# Patient Record
Sex: MALE | Race: ASIAN | NOT HISPANIC OR LATINO
[De-identification: names, ages, dates, MRNs, and addresses within clinical notes are randomized per-mention and may not be internally consistent; named-entity substitution may affect disease eponyms.]

---

## 2017-02-17 VITALS
HEART RATE: 68 BPM | SYSTOLIC BLOOD PRESSURE: 120 MMHG | WEIGHT: 154.98 LBS | DIASTOLIC BLOOD PRESSURE: 79 MMHG | OXYGEN SATURATION: 99 % | RESPIRATION RATE: 18 BRPM | HEIGHT: 65 IN

## 2017-02-17 RX ORDER — CHLORHEXIDINE GLUCONATE 213 G/1000ML
1 SOLUTION TOPICAL ONCE
Qty: 0 | Refills: 0 | Status: DISCONTINUED | OUTPATIENT
Start: 2017-02-21 | End: 2017-02-21

## 2017-02-17 NOTE — H&P ADULT. - ASSESSMENT
51 yr old M with PMHx of HTN, hyperlipidemia, known CAD with prior PCI, with CCS Angina Class III equivalent symptoms and known residual LAD disease, who now presents for recommended cardiac catheterization with possible intervention.    ASA III                     Mallampati II    Risks & benefits of procedure and alternative therapy have been explained to the patient including but not limited to: allergic reaction, bleeding w/possible need for blood transfusion, infection, renal and vascular compromise, limb damage, arrhythmia, stroke, vessel dissection/perforation, Myocardial infarction, emergent CABG. Informed consent obtained and in chart. 51 yr old M with PMHx of HTN, hyperlipidemia, known CAD with prior PCI, with CCS Angina Class III equivalent symptoms on metoprolol and known residual LAD disease, who now presents for recommended cardiac catheterization with possible intervention.    ASA III                     Mallampati II    Risks & benefits of procedure and alternative therapy have been explained to the patient including but not limited to: allergic reaction, bleeding w/possible need for blood transfusion, infection, renal and vascular compromise, limb damage, arrhythmia, stroke, vessel dissection/perforation, Myocardial infarction, emergent CABG. Informed consent obtained and in chart.

## 2017-02-17 NOTE — H&P ADULT. - NEGATIVE CARDIOVASCULAR SYMPTOMS
no paroxysmal nocturnal dyspnea/no palpitations/no dyspnea on exertion/no claudication/no peripheral edema/no orthopnea

## 2017-02-17 NOTE — H&P ADULT. - PROBLEM SELECTOR PLAN 1
NPO, precath/consented, will load with ASA 325mg PO x 1 dose and Plavix 600mg PO x 1 dose prior to procedure NPO, precath/consented, will load with ASA 325mg PO x 1 dose and Plavix 600mg PO x 1 dose prior to procedure. Patient states he will be compliant with all his medications upon discharge, as patient was explained risks of stopping his medications if he receives a stent placement today

## 2017-02-17 NOTE — H&P ADULT. - HISTORY OF PRESENT ILLNESS
52 y/o M h/o medication non-compliance 2/2 no insurance, PMHx HTN, HLD, known CAD s/p NSTEMI 7/2016 at [facility]( 50 y/o M h/o medication non-compliance 2/2 no insurance, PMHx HTN, HLD, known CAD s/p NSTEMI 7/2016 at [facility](RAMÍREZ prox and mRCA with known residual LAD disease), who presented for a f/u visit in Dr. Ordaz's office with c/o non-radiating chest pain described as a "twinge" on ambulation of 1 flight of stairs relieved on its own and less severe as his symptoms when he had his NSTEMI in July 2016..  Pt reports he only takes ASA 81mg as he discontinued all other medication including his Plavix 2 months ago as he "ran out" because he has no insurance.  Denies any CP, SOB at rest, palpitations, PND, orthopnea, LE edema.  Echo done 7/26/16 revealed LVEF 72%, no regional wall motion abnormalities, mild MR.     In light of patient's medication non-compliance with residual LAD disease and CCS Class 3 Anginal Equivalent symptoms he is now referred to [facility] for recommended Cardiac Catheterization with intervention if clinically indicated. 52 y/o M h/o medication non-compliance 2/2 no insurance, PMHx HTN, HLD, known CAD s/p NSTEMI 7/2016 at [facility](RAMÍREZ prox and mRCA with known residual LAD disease), who presented for a f/u visit in Dr. Ordaz's office with c/o non-radiating chest pain described as a "twinge" on ambulation of 1 flight of stairs relieved on its own and less severe as his symptoms when he had his NSTEMI in July 2016..  Pt reports he only takes ASA 81mg as he discontinued all other medication including his Plavix 2 months ago as he "ran out" because he has no insurance.  Denies any CP, SOB at rest, SANDOVAL, palpitations, PND, orthopnea, LE edema.  Echo done 7/26/16 revealed LVEF 72%, no regional wall motion abnormalities, mild MR.     In light of patient's medication non-compliance with residual LAD disease and CCS Class 3 Anginal Equivalent symptoms he is now referred to [facility] for recommended Cardiac Catheterization with intervention if clinically indicated.    Cardiac Cath at [facility] 7/27/16  -LM mild LI  -pLAD diffuse 80% stenosis  -D1 mild LI  -Cfx minor LI  -OM1 minor LI  -RCA large dominant  -pRCA 80% stenosis tx with RAMÍREZ x 1  -mRCA 85% stenosis tx with RAMÍREZ x 1  -Recommended to return for staged PCI of LAD in 4 weeks 50 y/o M h/o medication non-compliance 2/2 no insurance, PMHx HTN, HLD, known CAD s/p NSTEMI 7/2016 at [facility](RAMÍREZ prox and mRCA with known residual LAD disease), who presented for a f/u visit in Dr. Ordaz's office with c/o non-radiating chest pain described as a "twinge" on ambulation of 1 flight of stairs relieved on its own and less severe as his symptoms when he had his NSTEMI in July 2016..  Pt reports he only takes ASA 81mg as he discontinued all other medication including his Plavix 2 months ago as he "ran out" because he has no insurance.  Denies any CP, SOB at rest, SANDOVAL, palpitations, PND, orthopnea, LE edema.  Echo done 7/26/16 revealed LVEF 72%, no regional wall motion abnormalities, mild MR.     In light of patient's medication non-compliance with residual LAD disease and CCS Class III Anginal Equivalent symptoms he is now referred to [facility] for recommended Cardiac Catheterization with intervention if clinically indicated.    Cardiac Cath at [facility] 7/27/16  -LM mild LI  -pLAD diffuse 80% stenosis  -D1 mild LI  -Cfx minor LI  -OM1 minor LI  -RCA large dominant  -pRCA 80% stenosis tx with RAMÍREZ x 1  -mRCA 85% stenosis tx with RAMÍREZ x 1  -Recommended to return for staged PCI of LAD in 4 weeks 50 y/o M with PMHx HTN, HLD, known CAD s/p NSTEMI 7/2016 at [facility](RAMÍREZ prox and mRCA with known residual LAD disease), who presented for a f/u visit in Dr. Ordaz's office with c/o non-radiating chest pain described as a "twinge" on ambulation of 1 flight of stairs relieved on its own and less severe as his symptoms when he had his NSTEMI in July 2016.  Pt reports he only takes ASA 81mg as he discontinued all other medication including his Plavix 2 months ago as he "ran out" because he had no insurance, however resumed his medications on Friday 2/17/17 after his insurance was reactivated.  Denies any CP, SOB at rest, SANDOVAL, palpitations, PND, orthopnea, LE edema.  Echo done 7/26/16 revealed LVEF 72%, no regional wall motion abnormalities, mild MR.     In light of patient's medication non-compliance with residual LAD disease and CCS Class III Anginal Equivalent symptoms he is now referred to [facility] for recommended Cardiac Catheterization with intervention if clinically indicated.    Of note: Patient states his insurance has been reactivated and he was prescribed medications on Friday 2/17/17 which he has since been compliant with.     Cardiac Cath at [facility] 7/27/16  -LM mild LI  -pLAD diffuse 80% stenosis  -D1 mild LI  -Cfx minor LI  -OM1 minor LI  -RCA large dominant  -pRCA 80% stenosis tx with RAMÍREZ x 1  -mRCA 85% stenosis tx with RAMÍREZ x 1  -Recommended to return for staged PCI of LAD in 4 weeks 52 y/o M with PMHx HTN, HLD, known CAD s/p NSTEMI 7/2016 at [facility](RAMÍREZ prox and mRCA with known residual LAD disease), who presented for a f/u visit in Dr. Ordaz's office with c/o non-radiating chest pain described as a "twinge" on ambulation of 1 flight of stairs relieved on its own and less severe as his symptoms when he had his NSTEMI in July 2016.  Pt reports he only takes ASA 81mg as he discontinued all other medication including his Plavix 2 months ago as he "ran out" because he had no insurance, however resumed his medications on Friday 2/17/17 after his insurance was reactivated.  Denies any CP, SOB at rest, SANDOVAL, palpitations, PND, orthopnea, LE edema.  Echo done 7/26/16 revealed LVEF 72%, no regional wall motion abnormalities, mild MR.     In light of patient's medication non-compliance with residual LAD disease and CCS Class III Anginal Equivalent symptoms while on metoprolol, he is now referred to [facility] for recommended Cardiac Catheterization with intervention if clinically indicated.    Of note: Patient states his insurance has been reactivated and he was prescribed medications on Friday 2/17/17 which he has since been compliant with.     Cardiac Cath at [facility] 7/27/16  -LM mild LI  -pLAD diffuse 80% stenosis  -D1 mild LI  -Cfx minor LI  -OM1 minor LI  -RCA large dominant  -pRCA 80% stenosis tx with RAMÍREZ x 1  -mRCA 85% stenosis tx with RAMÍREZ x 1  -Recommended to return for staged PCI of LAD in 4 weeks

## 2017-02-20 ENCOUNTER — FORM ENCOUNTER (OUTPATIENT)
Age: 52
End: 2017-02-20

## 2017-02-21 ENCOUNTER — OUTPATIENT (OUTPATIENT)
Dept: OUTPATIENT SERVICES | Facility: HOSPITAL | Age: 52
LOS: 1 days | Discharge: NURSING, REHAB OR LONG-TERM CARE | End: 2017-02-21
Payer: COMMERCIAL

## 2017-02-21 DIAGNOSIS — E78.5 HYPERLIPIDEMIA, UNSPECIFIED: ICD-10-CM

## 2017-02-21 DIAGNOSIS — I25.110 ATHEROSCLEROTIC HEART DISEASE OF NATIVE CORONARY ARTERY WITH UNSTABLE ANGINA PECTORIS: ICD-10-CM

## 2017-02-21 DIAGNOSIS — I25.10 ATHEROSCLEROTIC HEART DISEASE OF NATIVE CORONARY ARTERY WITHOUT ANGINA PECTORIS: Chronic | ICD-10-CM

## 2017-02-21 DIAGNOSIS — I20.9 ANGINA PECTORIS, UNSPECIFIED: ICD-10-CM

## 2017-02-21 DIAGNOSIS — I10 ESSENTIAL (PRIMARY) HYPERTENSION: ICD-10-CM

## 2017-02-21 DIAGNOSIS — Z82.49 FAMILY HISTORY OF ISCHEMIC HEART DISEASE AND OTHER DISEASES OF THE CIRCULATORY SYSTEM: ICD-10-CM

## 2017-02-21 PROBLEM — I21.4 NON-ST ELEVATION (NSTEMI) MYOCARDIAL INFARCTION: Chronic | Status: ACTIVE | Noted: 2017-02-17

## 2017-02-21 PROBLEM — Z00.00 ENCOUNTER FOR PREVENTIVE HEALTH EXAMINATION: Status: ACTIVE | Noted: 2017-02-21

## 2017-02-21 LAB
ALBUMIN SERPL ELPH-MCNC: 3.9 G/DL — SIGNIFICANT CHANGE UP (ref 3.4–5)
ALP SERPL-CCNC: 108 U/L — SIGNIFICANT CHANGE UP (ref 40–120)
ALT FLD-CCNC: 60 U/L — HIGH (ref 12–42)
ANION GAP SERPL CALC-SCNC: 8 MMOL/L — LOW (ref 9–16)
APPEARANCE UR: CLEAR — SIGNIFICANT CHANGE UP
APTT BLD: 32.9 SEC — SIGNIFICANT CHANGE UP (ref 27.5–37.4)
AST SERPL-CCNC: 33 U/L — SIGNIFICANT CHANGE UP (ref 15–37)
BASOPHILS NFR BLD AUTO: 0.2 % — SIGNIFICANT CHANGE UP (ref 0–2)
BILIRUB SERPL-MCNC: 0.6 MG/DL — SIGNIFICANT CHANGE UP (ref 0.2–1.2)
BILIRUB UR-MCNC: NEGATIVE — SIGNIFICANT CHANGE UP
BLD GP AB SCN SERPL QL: NEGATIVE — SIGNIFICANT CHANGE UP
BUN SERPL-MCNC: 10 MG/DL — SIGNIFICANT CHANGE UP (ref 7–23)
CALCIUM SERPL-MCNC: 8.8 MG/DL — SIGNIFICANT CHANGE UP (ref 8.5–10.5)
CHLORIDE SERPL-SCNC: 105 MMOL/L — SIGNIFICANT CHANGE UP (ref 96–108)
CHOLEST SERPL-MCNC: 193 MG/DL — SIGNIFICANT CHANGE UP
CK MB CFR SERPL CALC: 1.3 NG/ML — SIGNIFICANT CHANGE UP (ref 0.5–3.6)
CO2 SERPL-SCNC: 27 MMOL/L — SIGNIFICANT CHANGE UP (ref 22–31)
COLOR SPEC: YELLOW — SIGNIFICANT CHANGE UP
CREAT SERPL-MCNC: 0.83 MG/DL — SIGNIFICANT CHANGE UP (ref 0.5–1.3)
CRP SERPL-MCNC: <0.29 MG/DL — SIGNIFICANT CHANGE UP
DIFF PNL FLD: NEGATIVE — SIGNIFICANT CHANGE UP
EOSINOPHIL NFR BLD AUTO: 3 % — SIGNIFICANT CHANGE UP (ref 0–6)
GLUCOSE SERPL-MCNC: 114 MG/DL — HIGH (ref 70–99)
GLUCOSE UR QL: 100
HBA1C BLD-MCNC: 6.2 % — HIGH (ref 4.8–5.6)
HBV SURFACE AG SER-ACNC: SIGNIFICANT CHANGE UP
HCT VFR BLD CALC: 42.8 % — SIGNIFICANT CHANGE UP (ref 39–50)
HDLC SERPL-MCNC: 46 MG/DL — SIGNIFICANT CHANGE UP
HGB BLD-MCNC: 15.2 G/DL — SIGNIFICANT CHANGE UP (ref 13–17)
INR BLD: 1.12 — SIGNIFICANT CHANGE UP (ref 0.88–1.16)
KETONES UR-MCNC: NEGATIVE — SIGNIFICANT CHANGE UP
LEUKOCYTE ESTERASE UR-ACNC: NEGATIVE — SIGNIFICANT CHANGE UP
LIPID PNL WITH DIRECT LDL SERPL: 129 MG/DL — HIGH
LYMPHOCYTES # BLD AUTO: 43.1 % — SIGNIFICANT CHANGE UP (ref 13–44)
MCHC RBC-ENTMCNC: 31.5 PG — SIGNIFICANT CHANGE UP (ref 27–34)
MCHC RBC-ENTMCNC: 35.5 G/DL — SIGNIFICANT CHANGE UP (ref 32–36)
MCV RBC AUTO: 88.6 FL — SIGNIFICANT CHANGE UP (ref 80–100)
MONOCYTES NFR BLD AUTO: 11.1 % — SIGNIFICANT CHANGE UP (ref 2–14)
NEUTROPHILS NFR BLD AUTO: 42.6 % — LOW (ref 43–77)
NITRITE UR-MCNC: NEGATIVE — SIGNIFICANT CHANGE UP
PH UR: 7 — SIGNIFICANT CHANGE UP (ref 4–8)
PLATELET # BLD AUTO: 206 K/UL — SIGNIFICANT CHANGE UP (ref 150–400)
POTASSIUM SERPL-MCNC: 4 MMOL/L — SIGNIFICANT CHANGE UP (ref 3.5–5.3)
POTASSIUM SERPL-SCNC: 4 MMOL/L — SIGNIFICANT CHANGE UP (ref 3.5–5.3)
PROT SERPL-MCNC: 8.1 G/DL — SIGNIFICANT CHANGE UP (ref 6.4–8.2)
PROT UR-MCNC: NEGATIVE MG/DL — SIGNIFICANT CHANGE UP
PROTHROM AB SERPL-ACNC: 12.5 SEC — SIGNIFICANT CHANGE UP (ref 10–13.1)
RBC # BLD: 4.83 M/UL — SIGNIFICANT CHANGE UP (ref 4.2–5.8)
RBC # FLD: 12.4 % — SIGNIFICANT CHANGE UP (ref 10.3–16.9)
RH IG SCN BLD-IMP: POSITIVE — SIGNIFICANT CHANGE UP
SODIUM SERPL-SCNC: 140 MMOL/L — SIGNIFICANT CHANGE UP (ref 135–145)
SP GR SPEC: <=1.005 — SIGNIFICANT CHANGE UP (ref 1–1.03)
TOTAL CHOLESTEROL/HDL RATIO MEASUREMENT: 4.2 RATIO — SIGNIFICANT CHANGE UP
TRIGL SERPL-MCNC: 91 MG/DL — SIGNIFICANT CHANGE UP
TSH SERPL-MCNC: 0.78 UIU/ML — SIGNIFICANT CHANGE UP (ref 0.35–4.94)
UROBILINOGEN FLD QL: 0.2 E.U./DL — SIGNIFICANT CHANGE UP
WBC # BLD: 4.9 K/UL — SIGNIFICANT CHANGE UP (ref 3.8–10.5)
WBC # FLD AUTO: 4.9 K/UL — SIGNIFICANT CHANGE UP (ref 3.8–10.5)

## 2017-02-21 PROCEDURE — 85730 THROMBOPLASTIN TIME PARTIAL: CPT

## 2017-02-21 PROCEDURE — 92978 ENDOLUMINL IVUS OCT C 1ST: CPT

## 2017-02-21 PROCEDURE — 85610 PROTHROMBIN TIME: CPT

## 2017-02-21 PROCEDURE — 93306 TTE W/DOPPLER COMPLETE: CPT | Mod: 26

## 2017-02-21 PROCEDURE — 86850 RBC ANTIBODY SCREEN: CPT

## 2017-02-21 PROCEDURE — 93880 EXTRACRANIAL BILAT STUDY: CPT

## 2017-02-21 PROCEDURE — C1887: CPT

## 2017-02-21 PROCEDURE — 71045 X-RAY EXAM CHEST 1 VIEW: CPT

## 2017-02-21 PROCEDURE — C1769: CPT

## 2017-02-21 PROCEDURE — 84443 ASSAY THYROID STIM HORMONE: CPT

## 2017-02-21 PROCEDURE — C1753: CPT

## 2017-02-21 PROCEDURE — 82550 ASSAY OF CK (CPK): CPT

## 2017-02-21 PROCEDURE — 92978 ENDOLUMINL IVUS OCT C 1ST: CPT | Mod: 26

## 2017-02-21 PROCEDURE — 93010 ELECTROCARDIOGRAM REPORT: CPT | Mod: 59

## 2017-02-21 PROCEDURE — 94150 VITAL CAPACITY TEST: CPT

## 2017-02-21 PROCEDURE — 83036 HEMOGLOBIN GLYCOSYLATED A1C: CPT

## 2017-02-21 PROCEDURE — 93306 TTE W/DOPPLER COMPLETE: CPT

## 2017-02-21 PROCEDURE — 71010: CPT | Mod: 26

## 2017-02-21 PROCEDURE — 93458 L HRT ARTERY/VENTRICLE ANGIO: CPT

## 2017-02-21 PROCEDURE — 93005 ELECTROCARDIOGRAM TRACING: CPT

## 2017-02-21 PROCEDURE — 80053 COMPREHEN METABOLIC PANEL: CPT

## 2017-02-21 PROCEDURE — 81003 URINALYSIS AUTO W/O SCOPE: CPT

## 2017-02-21 PROCEDURE — 80061 LIPID PANEL: CPT

## 2017-02-21 PROCEDURE — 93458 L HRT ARTERY/VENTRICLE ANGIO: CPT | Mod: 26

## 2017-02-21 PROCEDURE — 86900 BLOOD TYPING SEROLOGIC ABO: CPT

## 2017-02-21 PROCEDURE — 82553 CREATINE MB FRACTION: CPT

## 2017-02-21 PROCEDURE — 86140 C-REACTIVE PROTEIN: CPT

## 2017-02-21 PROCEDURE — 93880 EXTRACRANIAL BILAT STUDY: CPT | Mod: 26

## 2017-02-21 PROCEDURE — 87340 HEPATITIS B SURFACE AG IA: CPT

## 2017-02-21 PROCEDURE — 85025 COMPLETE CBC W/AUTO DIFF WBC: CPT

## 2017-02-21 PROCEDURE — 94010 BREATHING CAPACITY TEST: CPT | Mod: 26

## 2017-02-21 PROCEDURE — 86901 BLOOD TYPING SEROLOGIC RH(D): CPT

## 2017-02-21 PROCEDURE — 99205 OFFICE O/P NEW HI 60 MIN: CPT

## 2017-02-21 RX ORDER — SODIUM CHLORIDE 9 MG/ML
500 INJECTION INTRAMUSCULAR; INTRAVENOUS; SUBCUTANEOUS
Qty: 0 | Refills: 0 | Status: DISCONTINUED | OUTPATIENT
Start: 2017-02-21 | End: 2017-02-21

## 2017-02-21 RX ORDER — ASPIRIN/CALCIUM CARB/MAGNESIUM 324 MG
325 TABLET ORAL ONCE
Qty: 0 | Refills: 0 | Status: COMPLETED | OUTPATIENT
Start: 2017-02-21 | End: 2017-02-21

## 2017-02-21 RX ORDER — CLOPIDOGREL BISULFATE 75 MG/1
600 TABLET, FILM COATED ORAL ONCE
Qty: 0 | Refills: 0 | Status: COMPLETED | OUTPATIENT
Start: 2017-02-21 | End: 2017-02-21

## 2017-02-21 RX ADMIN — Medication 325 MILLIGRAM(S): at 08:00

## 2017-02-21 RX ADMIN — SODIUM CHLORIDE 75 MILLILITER(S): 9 INJECTION INTRAMUSCULAR; INTRAVENOUS; SUBCUTANEOUS at 08:00

## 2017-02-21 RX ADMIN — CLOPIDOGREL BISULFATE 600 MILLIGRAM(S): 75 TABLET, FILM COATED ORAL at 07:59

## 2017-02-21 NOTE — CONSULT NOTE ADULT - ASSESSMENT
51y Male w/ pmhx HTN, HLD, known CAD s/p NSTEMI 7/2016 at [facility] (RAMÍREZ prox and mRCA with known residual LAD disease) who presented for diagnostic and therapeutic cardiac catherization today with Dr. Ordaz. Pt states he presented for follow up visit with Dr. Ordaz with c/o non-radiating chest pain and SANDOVAL with 1 flight of stairs at home and alleviated at rest. Pt states the chest pain has not progressed and is in less severity from when he had his heart attack back in July. Pt has not been compliant with his medications for the past couple of months due to social issues and lack of insurance. Pt insurance has been reinstated and resumed his medications as of Friday 2/17/17. Cardiac catherization today resulted in 3VCAD and CT Surgery consulted for possible surgical intervention in near future. Pt currently denies chest pain, SOB, SANDOVAL, palpitations, PND, orthopnea, LE edema, dizziness, N/V/C/D.  -preoperative workup for possible surgical intervention: follow up with bedside PFTs, echocardiogram, carotid duplex b/l, T&S  -plan for discharge today as per primary team and SDA on March 1st for possible CABG  -plan discussed with Dr. Denny

## 2017-02-21 NOTE — CONSULT NOTE ADULT - SUBJECTIVE AND OBJECTIVE BOX
Surgeon: Presley Denny    Requesting Physician: Sergio Ordaz    HISTORY OF PRESENT ILLNESS:  51y Male w/ pmhx HTN, HLD, known CAD s/p NSTEMI 7/2016 at [facility] (RAMÍREZ prox and mRCA with known residual LAD disease) who presented for diagnostic and therapeutic cardiac catherization today with Dr. Ordaz. Pt states he presented for follow up visit with Dr. Ordaz with c/o non-radiating chest pain and SANDOVAL with 1 flight of stairs at home and alleviated at rest. Pt states the chest pain has not progressed and is in less severity from when he had his heart attack back in July. Pt has not been compliant with his medications for the past couple of months due to social issues and lack of insurance. Pt insurance has been reinstated and resumed his medications as of Friday 2/17/17. Cardiac catherization today resulted in 3VCAD and CT Surgery consulted for possible surgical intervention in near future. Pt currently denies chest pain, SOB, SANDOVAL, palpitations, PND, orthopnea, LE edema, dizziness, N/V/C/D.    PAST MEDICAL & SURGICAL HISTORY:  NSTEMI (non-ST elevated myocardial infarction)  HLD (hyperlipidemia)  HTN (hypertension)  CAD S/P percutaneous coronary angioplasty      MEDICATIONS  (STANDING):  chlorhexidine 4% Liquid 1Application(s) Topical once  sodium chloride 0.9%. 500milliLiter(s) IV Continuous <Continuous>      Allergies  No Known Allergies    Intolerances  None      SOCIAL HISTORY:  Smoker:  1pack/day for 20 years. Patient quit smoking 2010.  ETOH use: 12 beers/weekend for 20 years. Patient quit drinking since July 2016.  Ilicit Drug use: No  Occupation: elevator   Assisted device use (Cane / Walker): none  Live with: spouse and children at home    FAMILY HISTORY:  No pertinent family history in first degree relatives      Review of Systems  CONSTITUTIONAL:  Denies Fevers / chills, sweats, fatigue, weight loss, weight gain  NEURO:  Denies parathesias, seizures, syncope, confusion   EYES:  Denies Blurry vision, discharge, pain, loss of vision   ENMT:  Denies Difficulty hearing, vertigo, dysphagia, epistaxis, recent dental work  CV:  Denies palpitations, orthopnea. Positive for Chest pain and SANDOVAL.  RESPIRATORY:  Denies Wheezing, SOB, cough / sputum, hemoptysis  GI:  Denies Nausea, vomiting, diarrhea, constipation, melena   : Denies Hematuria, dysuria, urgency, incontinence    MUSKULOSKELETAL:  Denies arthritis, joint swelling, muscle weakness      SKIN/BREAST:  Denies rash, itching, tomy loss, masses       PSYCH:  Denies depression, anxiety, suicidal ideation    HEME/LYMPH:  Denies bruises easily, enlarged lymph nodes, tender lymph nodes    ENDOCRINE:  Denies cold intolerance, heat intolerance, polydipsia     PHYSICAL EXAM    CONSTITUTIONAL: well appearing male, conversing in full sentences in NAD  NEURO: A&OX3, no focal deficits  EYES: EOMI, PERRLA, arcus senilus b/l  ENMT: no JVD  CV: RRR, normal S1S2, no murmur appreciated  RESPIRATORY: CTAB, no wheezes/rales/rhonchi  GI: soft, NT/ND  : no MARTINEZ present  MUSKULOSKELETAL: sensation and strength intact  SKIN / BREAST: good color, no rashes  VASCULAR: +right radial band present, 2+ left radial pulse, 2+ dorsalis pedis b/l                                                          LABS:                        15.2   4.9   )-----------( 206      ( 21 Feb 2017 07:07 )             42.8     21 Feb 2017 07:07    140    |  105    |  10     ----------------------------<  114    4.0     |  27     |  0.83     Ca    8.8        21 Feb 2017 07:07    TPro  8.1    /  Alb  3.9    /  TBili  0.6    /  DBili  x      /  AST  33     /  ALT  60     /  AlkPhos  108    21 Feb 2017 07:07    PT/INR - ( 21 Feb 2017 07:07 )   PT: 12.5 sec;   INR: 1.12          PTT - ( 21 Feb 2017 07:07 )  PTT:32.9 sec    CARDIAC MARKERS ( 21 Feb 2017 07:07 )  x     / x     / 155 U/L / x     / 1.3 ng/mL          RADIOLOGY & ADDITIONAL STUDIES:  CAROTID U/S: pending results today    CXR: pending results today    EKG: pending test today    TTE / ROMAIN: pending results today    Cardiac Cath: 3VCAD 2/21/17

## 2017-02-23 VITALS
RESPIRATION RATE: 18 BRPM | OXYGEN SATURATION: 99 % | WEIGHT: 154.32 LBS | HEART RATE: 68 BPM | DIASTOLIC BLOOD PRESSURE: 79 MMHG | SYSTOLIC BLOOD PRESSURE: 120 MMHG | HEIGHT: 64.96 IN | TEMPERATURE: 98 F

## 2017-02-23 NOTE — H&P ADULT. - PMH
CAD (coronary artery disease)    HLD (hyperlipidemia)    HTN (hypertension)    NSTEMI (non-ST elevated myocardial infarction)

## 2017-02-23 NOTE — H&P ADULT. - OTHER
Echo  There is borderline concentric left ventricular hypertrophy. The left ventricular wall motion is normal.  The left ventricular ejection fraction is estimated to be 55-60%.  The left atrial size is normal. Right atrial size is normal.  The right ventricle is mildly dilated. The right ventricular systolic function is normal.  No aortic regurgitation noted. No hemodynamically significant valvular aortic stenosis.  No mitral regurgitation noted.  There is trace tricuspid regurgitation.  There is trace pulmonic regurgitation. There is no pulmonic stenosis.  No aortic root dilatation.  Normal size aortic arch with no hemodynamic evidence for coarctation  The inferior vena cava was not well visualized.  There is no pericardial effusion.

## 2017-02-23 NOTE — H&P ADULT. - HISTORY OF PRESENT ILLNESS
52 y/o M with PMHx HTN, HLD, known CAD s/p NSTEMI 7/2016 at [facility](RAMÍREZ prox and mRCA with known residual LAD disease), who presented for a f/u visit in Dr. Ordaz's office with c/o non-radiating chest pain described as a "twinge" on ambulation of 1 flight of stairs relieved on its own and less severe as his symptoms when he had his NSTEMI in July 2016.  Pt reports he only takes ASA 81mg as he discontinued all other medication including his Plavix 2 months ago as he "ran out" because he had no insurance, however resumed his medications on Friday 2/17/17 after his insurance was reactivated.  Denies any CP, SOB at rest, SANDOVAL, palpitations, PND, orthopnea, LE edema.  Echo done 7/26/16 revealed LVEF 72%, no regional wall motion abnormalities, mild MR.     In light of patient's medication non-compliance with residual LAD disease and CCS Class III Anginal Equivalent symptoms while on metoprolol, patient was recommended for Cardiac Catheterization. Cardiac cath 2/21/17 revealed: 3V CAD. Patient was evaluated by CT surgery and is deemed a surgical candidate for coronary artery bypass grafting.     Cardiac Cath at [facility] 7/27/16  -LM mild LI  -pLAD diffuse 80% stenosis  -D1 mild LI  -Cfx minor LI  -OM1 minor LI  -RCA large dominant  -pRCA 80% stenosis tx with RAMÍREZ x 1  -mRCA 85% stenosis tx with RAMÍREZ x 1  -Recommended to return for staged PCI of LAD in 4 weeks    Of note: Patient states his insurance has been reactivated and he was prescribed medications on Friday 2/17/17 which he has since been compliant with. 50 y/o M with PMHx HTN, HLD, known CAD s/p NSTEMI 7/2016 at [facility](RAMÍREZ prox and mRCA with known residual LAD disease), who presented for a f/u visit in Dr. Ordaz's office with c/o non-radiating chest pain described as a "twinge" on ambulation of 1 flight of stairs relieved on its own and less severe as his symptoms when he had his NSTEMI in July 2016.  Pt reports he only takes ASA 81mg as he discontinued all other medication including his Plavix 2 months ago as he "ran out" because he had no insurance, however resumed his medications on Friday 2/17/17 after his insurance was reactivated.  Denies any CP, SOB at rest, SANDOVAL, palpitations, PND, orthopnea, LE edema.  Echo done 7/26/16 revealed LVEF 72%, no regional wall motion abnormalities, mild MR.     In light of patient's medication non-compliance with residual LAD disease and CCS Class III Anginal Equivalent symptoms while on metoprolol, patient was recommended for Cardiac Catheterization. Cardiac cath 2/21/17 revealed: 3V CAD. Patient was evaluated by CT surgery and is deemed a surgical candidate for coronary artery bypass grafting.     Cardiac Cath at [facility] 7/27/16  -LM mild LI  -pLAD diffuse 80% stenosis  -D1 mild LI  -Cfx minor LI  -OM1 minor LI  -RCA large dominant  -pRCA 80% stenosis tx with RAMÍREZ x 1  -mRCA 85% stenosis tx with RAMÍREZ x 1  -Recommended to return for staged PCI of LAD in 4 weeks    Of note: Patient states his insurance has been reactivated and he was prescribed medications on Friday 2/17/17 which he has since been compliant with.     He is seen on 3/1 in SDA prior to surgery.  He denies CP, SOB, myalgias, fevers and has been NPO since last evening.  He also states he has stopped taking his plavix over a week ago.

## 2017-02-23 NOTE — H&P ADULT. - RADIOLOGY RESULTS AND INTERPRETATION
Chest xray: No acute pathology.  Carotid doppler: No hemodynamically significant stenosis.  Intimal thickening in the left common carotid artery.

## 2017-02-23 NOTE — H&P ADULT. - PROBLEM SELECTOR PLAN 1
Same Day Admission for CABG on 3/1/17 Same Day Admission for CABG on 3/1/17  Will proceed with surgery  Blood products ordered

## 2017-03-01 ENCOUNTER — APPOINTMENT (OUTPATIENT)
Dept: CARDIOTHORACIC SURGERY | Facility: HOSPITAL | Age: 52
End: 2017-03-01

## 2017-03-01 ENCOUNTER — INPATIENT (INPATIENT)
Facility: HOSPITAL | Age: 52
LOS: 3 days | Discharge: HOME | DRG: 236 | End: 2017-03-05
Attending: THORACIC SURGERY (CARDIOTHORACIC VASCULAR SURGERY) | Admitting: THORACIC SURGERY (CARDIOTHORACIC VASCULAR SURGERY)
Payer: COMMERCIAL

## 2017-03-01 DIAGNOSIS — I25.10 ATHEROSCLEROTIC HEART DISEASE OF NATIVE CORONARY ARTERY WITHOUT ANGINA PECTORIS: Chronic | ICD-10-CM

## 2017-03-01 LAB
ANION GAP SERPL CALC-SCNC: 10 MMOL/L — SIGNIFICANT CHANGE UP (ref 9–16)
ANION GAP SERPL CALC-SCNC: 8 MMOL/L — LOW (ref 9–16)
APTT BLD: 32 SEC — SIGNIFICANT CHANGE UP (ref 27.5–37.4)
BASE EXCESS BLDA CALC-SCNC: -0.8 MMOL/L — SIGNIFICANT CHANGE UP (ref -2–3)
BASE EXCESS BLDA CALC-SCNC: 0.6 MMOL/L — SIGNIFICANT CHANGE UP (ref -2–3)
BASOPHILS NFR BLD AUTO: 0.1 % — SIGNIFICANT CHANGE UP (ref 0–2)
BUN SERPL-MCNC: 12 MG/DL — SIGNIFICANT CHANGE UP (ref 7–23)
BUN SERPL-MCNC: 9 MG/DL — SIGNIFICANT CHANGE UP (ref 7–23)
CA-I BLDA-SCNC: 1.13 MMOL/L — SIGNIFICANT CHANGE UP (ref 1.1–1.3)
CALCIUM SERPL-MCNC: 7.4 MG/DL — LOW (ref 8.5–10.5)
CALCIUM SERPL-MCNC: 7.9 MG/DL — LOW (ref 8.5–10.5)
CHLORIDE SERPL-SCNC: 111 MMOL/L — HIGH (ref 96–108)
CHLORIDE SERPL-SCNC: 111 MMOL/L — HIGH (ref 96–108)
CO2 SERPL-SCNC: 24 MMOL/L — SIGNIFICANT CHANGE UP (ref 22–31)
CO2 SERPL-SCNC: 26 MMOL/L — SIGNIFICANT CHANGE UP (ref 22–31)
COHGB MFR BLDA: 1.2 % — SIGNIFICANT CHANGE UP
CREAT SERPL-MCNC: 0.74 MG/DL — SIGNIFICANT CHANGE UP (ref 0.5–1.3)
CREAT SERPL-MCNC: 0.82 MG/DL — SIGNIFICANT CHANGE UP (ref 0.5–1.3)
EOSINOPHIL NFR BLD AUTO: 0.1 % — SIGNIFICANT CHANGE UP (ref 0–6)
GAS PNL BLDA: SIGNIFICANT CHANGE UP
GLUCOSE SERPL-MCNC: 126 MG/DL — HIGH (ref 70–99)
GLUCOSE SERPL-MCNC: 137 MG/DL — HIGH (ref 70–99)
HCO3 BLDA-SCNC: 22 MMOL/L — SIGNIFICANT CHANGE UP (ref 21–28)
HCO3 BLDA-SCNC: 25 MMOL/L — SIGNIFICANT CHANGE UP (ref 21–28)
HCT VFR BLD CALC: 31.7 % — LOW (ref 39–50)
HCT VFR BLD CALC: 36.6 % — LOW (ref 39–50)
HGB BLD-MCNC: 10.8 G/DL — LOW (ref 13–17)
HGB BLD-MCNC: 12.9 G/DL — LOW (ref 13–17)
HGB BLDA-MCNC: 14.7 G/DL — SIGNIFICANT CHANGE UP (ref 13–17)
INR BLD: 1.29 — HIGH (ref 0.88–1.16)
INR BLD: 1.36 — HIGH (ref 0.88–1.16)
LYMPHOCYTES # BLD AUTO: 12.4 % — LOW (ref 13–44)
MAGNESIUM SERPL-MCNC: 2.4 MG/DL — SIGNIFICANT CHANGE UP (ref 1.6–2.4)
MCHC RBC-ENTMCNC: 29.3 PG — SIGNIFICANT CHANGE UP (ref 27–34)
MCHC RBC-ENTMCNC: 29.7 PG — SIGNIFICANT CHANGE UP (ref 27–34)
MCHC RBC-ENTMCNC: 34.1 G/DL — SIGNIFICANT CHANGE UP (ref 32–36)
MCHC RBC-ENTMCNC: 35.2 G/DL — SIGNIFICANT CHANGE UP (ref 32–36)
MCV RBC AUTO: 84.3 FL — SIGNIFICANT CHANGE UP (ref 80–100)
MCV RBC AUTO: 85.9 FL — SIGNIFICANT CHANGE UP (ref 80–100)
METHGB MFR BLDA: 0.2 % — SIGNIFICANT CHANGE UP
MONOCYTES NFR BLD AUTO: 13.4 % — SIGNIFICANT CHANGE UP (ref 2–14)
NEUTROPHILS NFR BLD AUTO: 74 % — SIGNIFICANT CHANGE UP (ref 43–77)
O2 CT VFR BLDA CALC: 21.4 ML/DL — SIGNIFICANT CHANGE UP (ref 15–23)
OXYHGB MFR BLDA: 98 % — SIGNIFICANT CHANGE UP (ref 94–100)
PCO2 BLDA: 32 MMHG — LOW (ref 35–48)
PCO2 BLDA: 39 MMHG — SIGNIFICANT CHANGE UP (ref 35–48)
PH BLDA: 7.42 — SIGNIFICANT CHANGE UP (ref 7.35–7.45)
PH BLDA: 7.46 — HIGH (ref 7.35–7.45)
PHOSPHATE SERPL-MCNC: 6.4 MG/DL — HIGH (ref 2.5–4.5)
PLATELET # BLD AUTO: 120 K/UL — LOW (ref 150–400)
PLATELET # BLD AUTO: 121 K/UL — LOW (ref 150–400)
PO2 BLDA: 188 MMHG — HIGH (ref 83–108)
PO2 BLDA: 409 MMHG — HIGH (ref 83–108)
POTASSIUM BLDA-SCNC: 4 MMOL/L — SIGNIFICANT CHANGE UP (ref 3.5–4.9)
POTASSIUM SERPL-MCNC: 4.2 MMOL/L — SIGNIFICANT CHANGE UP (ref 3.5–5.3)
POTASSIUM SERPL-MCNC: 4.3 MMOL/L — SIGNIFICANT CHANGE UP (ref 3.5–5.3)
POTASSIUM SERPL-SCNC: 4.2 MMOL/L — SIGNIFICANT CHANGE UP (ref 3.5–5.3)
POTASSIUM SERPL-SCNC: 4.3 MMOL/L — SIGNIFICANT CHANGE UP (ref 3.5–5.3)
PROTHROM AB SERPL-ACNC: 14.3 SEC — HIGH (ref 10–13.1)
PROTHROM AB SERPL-ACNC: 15.1 SEC — HIGH (ref 10–13.1)
RBC # BLD: 3.69 M/UL — LOW (ref 4.2–5.8)
RBC # BLD: 4.34 M/UL — SIGNIFICANT CHANGE UP (ref 4.2–5.8)
RBC # FLD: 13.5 % — SIGNIFICANT CHANGE UP (ref 10.3–16.9)
RBC # FLD: 14.2 % — SIGNIFICANT CHANGE UP (ref 10.3–16.9)
SAO2 % BLDA: 100 % — SIGNIFICANT CHANGE UP (ref 95–100)
SAO2 % BLDA: 99 % — SIGNIFICANT CHANGE UP (ref 95–100)
SODIUM BLDA-SCNC: 140 MMOL/L — SIGNIFICANT CHANGE UP (ref 138–146)
SODIUM SERPL-SCNC: 145 MMOL/L — SIGNIFICANT CHANGE UP (ref 135–145)
SODIUM SERPL-SCNC: 145 MMOL/L — SIGNIFICANT CHANGE UP (ref 135–145)
WBC # BLD: 13.6 K/UL — HIGH (ref 3.8–10.5)
WBC # BLD: 9.8 K/UL — SIGNIFICANT CHANGE UP (ref 3.8–10.5)
WBC # FLD AUTO: 13.6 K/UL — HIGH (ref 3.8–10.5)
WBC # FLD AUTO: 9.8 K/UL — SIGNIFICANT CHANGE UP (ref 3.8–10.5)

## 2017-03-01 PROCEDURE — 33533 CABG ARTERIAL SINGLE: CPT | Mod: AS

## 2017-03-01 PROCEDURE — 93010 ELECTROCARDIOGRAM REPORT: CPT

## 2017-03-01 PROCEDURE — 99291 CRITICAL CARE FIRST HOUR: CPT

## 2017-03-01 PROCEDURE — 71010: CPT | Mod: 26

## 2017-03-01 PROCEDURE — 33517 CABG ARTERY-VEIN SINGLE: CPT | Mod: AS

## 2017-03-01 RX ORDER — FAMOTIDINE 10 MG/ML
20 INJECTION INTRAVENOUS EVERY 12 HOURS
Qty: 0 | Refills: 0 | Status: DISCONTINUED | OUTPATIENT
Start: 2017-03-01 | End: 2017-03-02

## 2017-03-01 RX ORDER — KETOROLAC TROMETHAMINE 30 MG/ML
30 SYRINGE (ML) INJECTION EVERY 6 HOURS
Qty: 0 | Refills: 0 | Status: DISCONTINUED | OUTPATIENT
Start: 2017-03-01 | End: 2017-03-02

## 2017-03-01 RX ORDER — FENTANYL CITRATE 50 UG/ML
12.5 INJECTION INTRAVENOUS ONCE
Qty: 0 | Refills: 0 | Status: DISCONTINUED | OUTPATIENT
Start: 2017-03-01 | End: 2017-03-01

## 2017-03-01 RX ORDER — NICARDIPINE HYDROCHLORIDE 30 MG/1
2.5 CAPSULE, EXTENDED RELEASE ORAL
Qty: 40 | Refills: 0 | Status: DISCONTINUED | OUTPATIENT
Start: 2017-03-01 | End: 2017-03-02

## 2017-03-01 RX ORDER — HEPARIN SODIUM 5000 [USP'U]/ML
5000 INJECTION INTRAVENOUS; SUBCUTANEOUS EVERY 8 HOURS
Qty: 0 | Refills: 0 | Status: DISCONTINUED | OUTPATIENT
Start: 2017-03-01 | End: 2017-03-05

## 2017-03-01 RX ORDER — SODIUM CHLORIDE 9 MG/ML
1000 INJECTION, SOLUTION INTRAVENOUS
Qty: 0 | Refills: 0 | Status: DISCONTINUED | OUTPATIENT
Start: 2017-03-01 | End: 2017-03-05

## 2017-03-01 RX ORDER — CEFAZOLIN SODIUM 1 G
2000 VIAL (EA) INJECTION EVERY 8 HOURS
Qty: 0 | Refills: 0 | Status: COMPLETED | OUTPATIENT
Start: 2017-03-01 | End: 2017-03-03

## 2017-03-01 RX ORDER — FENTANYL CITRATE 50 UG/ML
25 INJECTION INTRAVENOUS ONCE
Qty: 0 | Refills: 0 | Status: DISCONTINUED | OUTPATIENT
Start: 2017-03-01 | End: 2017-03-01

## 2017-03-01 RX ORDER — ASPIRIN/CALCIUM CARB/MAGNESIUM 324 MG
81 TABLET ORAL DAILY
Qty: 0 | Refills: 0 | Status: DISCONTINUED | OUTPATIENT
Start: 2017-03-01 | End: 2017-03-02

## 2017-03-01 RX ORDER — ATORVASTATIN CALCIUM 80 MG/1
80 TABLET, FILM COATED ORAL AT BEDTIME
Qty: 0 | Refills: 0 | Status: DISCONTINUED | OUTPATIENT
Start: 2017-03-01 | End: 2017-03-05

## 2017-03-01 RX ORDER — MAGNESIUM SULFATE 500 MG/ML
2 VIAL (ML) INJECTION ONCE
Qty: 0 | Refills: 0 | Status: DISCONTINUED | OUTPATIENT
Start: 2017-03-01 | End: 2017-03-02

## 2017-03-01 RX ORDER — INSULIN HUMAN 100 [IU]/ML
1 INJECTION, SOLUTION SUBCUTANEOUS
Qty: 250 | Refills: 0 | Status: DISCONTINUED | OUTPATIENT
Start: 2017-03-01 | End: 2017-03-02

## 2017-03-01 RX ORDER — SODIUM CHLORIDE 9 MG/ML
500 INJECTION, SOLUTION INTRAVENOUS ONCE
Qty: 0 | Refills: 0 | Status: COMPLETED | OUTPATIENT
Start: 2017-03-01 | End: 2017-03-01

## 2017-03-01 RX ORDER — MAGNESIUM SULFATE 500 MG/ML
2 VIAL (ML) INJECTION ONCE
Qty: 0 | Refills: 0 | Status: COMPLETED | OUTPATIENT
Start: 2017-03-01 | End: 2017-03-02

## 2017-03-01 RX ORDER — CLOPIDOGREL BISULFATE 75 MG/1
75 TABLET, FILM COATED ORAL DAILY
Qty: 0 | Refills: 0 | Status: DISCONTINUED | OUTPATIENT
Start: 2017-03-01 | End: 2017-03-01

## 2017-03-01 RX ORDER — FENTANYL CITRATE 50 UG/ML
25 INJECTION INTRAVENOUS ONCE
Qty: 0 | Refills: 0 | Status: DISCONTINUED | OUTPATIENT
Start: 2017-03-01 | End: 2017-03-02

## 2017-03-01 RX ADMIN — Medication 100 MILLIGRAM(S): at 21:16

## 2017-03-01 RX ADMIN — FAMOTIDINE 20 MILLIGRAM(S): 10 INJECTION INTRAVENOUS at 18:09

## 2017-03-01 RX ADMIN — Medication 81 MILLIGRAM(S): at 23:20

## 2017-03-01 RX ADMIN — Medication 30 MILLIGRAM(S): at 20:30

## 2017-03-01 RX ADMIN — FENTANYL CITRATE 12.5 MICROGRAM(S): 50 INJECTION INTRAVENOUS at 15:49

## 2017-03-01 RX ADMIN — Medication 30 MILLIGRAM(S): at 20:45

## 2017-03-01 RX ADMIN — SODIUM CHLORIDE 1000 MILLILITER(S): 9 INJECTION, SOLUTION INTRAVENOUS at 17:50

## 2017-03-01 RX ADMIN — INSULIN HUMAN 1 UNIT(S)/HR: 100 INJECTION, SOLUTION SUBCUTANEOUS at 20:00

## 2017-03-01 RX ADMIN — HEPARIN SODIUM 5000 UNIT(S): 5000 INJECTION INTRAVENOUS; SUBCUTANEOUS at 21:16

## 2017-03-01 RX ADMIN — FENTANYL CITRATE 12.5 MICROGRAM(S): 50 INJECTION INTRAVENOUS at 16:03

## 2017-03-01 NOTE — PROGRESS NOTE ADULT - SUBJECTIVE AND OBJECTIVE BOX
INTERVAL HPI/OVERNIGHT EVENTS:    Op Day - CABG x 3  EF - normal    52yo male with Hx HTN, chol, CAD Hx MI/stents RCA (7/2016) with recommendation for staged procedure at that time presenting with persistent exertional CP    ECHO 7/26/16: EF 70%; no regional wall motion abnormalities, mild MR.   Cath 2/21 - 3vCAD and referred to CTS    PMHx includes but is not limited to:  CAD (coronary artery disease)  NSTEMI (non-ST elevated myocardial infarction)  HLD (hyperlipidemia)  HTN (hypertension)  CAD S/P percutaneous coronary angioplasty        ICU Vital Signs Last 24 Hrs  T(C): 36.9, Max: 36.9 (03-01 @ 14:15)  T(F): 98.4, Max: 98.4 (03-01 @ 14:15)  HR: 86 (84 - 88)  BP: 111/77 (111/77 - 138/92)  BP(mean): 90 (90 - 119)  ABP: 118/64 (118/64 - 142/84)  ABP(mean): 80 (79 - 106)  RR: 14 (14 - 16)  SpO2: 100% (100% - 100%)    Qtts:     I&O's Summary    I & Os for current day (as of 01 Mar 2017 15:42)  =============================================  IN: 20 ml / OUT: 216 ml / NET: -196 ml      Mode: AC/ CMV (Assist Control/ Continuous Mandatory Ventilation)  RR (machine): 10  TV (machine): 550  FiO2: 50  PEEP: 5  ITime: 1  MAP: 9  PIP: 23      Physical Exam    Heart  Lungs  Abd  Ext  Chest  Neuro  Skin    LABS:                        12.9   13.6  )-----------( 121      ( 01 Mar 2017 14:57 )             36.6     01 Mar 2017 14:57    145    |  111    |  9      ----------------------------<  126    4.3     |  26     |  0.74     Ca    7.9        01 Mar 2017 14:57      PT/INR - ( 01 Mar 2017 14:57 )   PT: 14.3 sec;   INR: 1.29          PTT - ( 01 Mar 2017 14:57 )  PTT:32.0 sec    ABG - ( 01 Mar 2017 14:56 )  pH: 7.46  /  pCO2: 32    /  pO2: 188   / HCO3: 22    / Base Excess: -0.8  /  SaO2: 99                  RADIOLOGY & ADDITIONAL STUDIES:    CRITICAL CARE TIME SPENT: INTERVAL HPI/OVERNIGHT EVENTS:    Op Day - CABG x 3  EF - normal    50yo male with Hx HTN, chol, CAD Hx MI/stents RCA (7/2016) with recommendation for staged procedure at that time presenting with persistent exertional CP    ECHO 7/26/16: EF 70%; no regional wall motion abnormalities, mild MR.   Cath 2/21 - 3vCAD and referred to CTS    to OR - given 1 U pRBC intraop; 3L crystalloid/UO 1500cc  v wire in place - back-up VVI 40  no drips on arrival to ICU    PMHx includes but is not limited to:  CAD - Hx MI/stents to RCA (July 2016)   Chol  HTN     ICU Vital Signs Last 24 Hrs  T(C): 36.9, Max: 36.9 (03-01 @ 14:15)  T(F): 98.4, Max: 98.4 (03-01 @ 14:15)  HR: 86 (84 - 88) sinus ; VVI 40 pacer backup  BP: 111/77 (111/77 - 138/92)  BP(mean): 90 (90 - 119)  ABP: 118/64 (118/64 - 142/84)  ABP(mean): 80 (79 - 106)  SpO2: 100% (100% - 100%) Vent Fi02 40%    Qtts: None    I&O's Summary    I & Os for current day (as of 01 Mar 2017 15:42)  =============================================  IN: 20 ml / OUT: 216 ml / NET: -196 ml    Vent settings 10/550/40/5    Physical Exam    Heart - regular (-)rub/gallop  Lungs - CTA anterior (-)wheeze/rhonchi  Abd - (+)BS but decrease; softly distended (-)r/r/g  Ext - cool to touch 1+ peripheral pulses; chronic stasis changes bilaterally  Chest - op bandage in place  Neuro - sedate after pain meds - pupils equal/reactive.   Skin - no rash    LABS:                        12.9   13.6  )-----------( 121      ( 01 Mar 2017 14:57 )             36.6     01 Mar 2017 14:57    145    |  111    |  9      ----------------------------<  126    4.3     |  26     |  0.74     Ca    7.9        01 Mar 2017 14:57    PT/INR - ( 01 Mar 2017 14:57 )   PT: 14.3 sec;   INR: 1.29     pTT - ( 01 Mar 2017 14:57 )  PTT:32.0 sec    ABG - ( 01 Mar 2017 14:56 ) 7.46/32/188/99    RADIOLOGY & ADDITIONAL STUDIES: reviewed    Patient with Hx CAD/Prior MI/stents with persistent anginal sxs now s/p CABG x 3    1. CV -   not requiring pressors - monitor BP - if sustained elevated BP start cardene  anticipate beta blocker once taking po  ASA - confirmed with Dr Denny - plavix NOT needed post-procedure  complete periop Abx prophylaxis  statin once taking po    2. Pulm   anticipate rapid wean to extubate in short post-op course  monitor chest tube output    3. Endocrine  maintain glycemic control  HgA1c 6.2  post-op glucose 126    d/w staff and Dr Denny    CRITICAL CARE TIME SPENT: 60 min

## 2017-03-02 LAB
ALBUMIN SERPL ELPH-MCNC: 2.5 G/DL — LOW (ref 3.4–5)
ALP SERPL-CCNC: 60 U/L — SIGNIFICANT CHANGE UP (ref 40–120)
ALT FLD-CCNC: 35 U/L — SIGNIFICANT CHANGE UP (ref 12–42)
ANION GAP SERPL CALC-SCNC: 7 MMOL/L — LOW (ref 9–16)
ANION GAP SERPL CALC-SCNC: 8 MMOL/L — LOW (ref 9–16)
APTT BLD: 36.4 SEC — SIGNIFICANT CHANGE UP (ref 27.5–37.4)
APTT BLD: 41.1 SEC — HIGH (ref 27.5–37.4)
AST SERPL-CCNC: 46 U/L — HIGH (ref 15–37)
BASE EXCESS BLDA CALC-SCNC: -0.1 MMOL/L — SIGNIFICANT CHANGE UP (ref -2–3)
BASOPHILS NFR BLD AUTO: 0.1 % — SIGNIFICANT CHANGE UP (ref 0–2)
BILIRUB SERPL-MCNC: 0.5 MG/DL — SIGNIFICANT CHANGE UP (ref 0.2–1.2)
BUN SERPL-MCNC: 12 MG/DL — SIGNIFICANT CHANGE UP (ref 7–23)
BUN SERPL-MCNC: 14 MG/DL — SIGNIFICANT CHANGE UP (ref 7–23)
CALCIUM SERPL-MCNC: 7.4 MG/DL — LOW (ref 8.5–10.5)
CALCIUM SERPL-MCNC: 7.4 MG/DL — LOW (ref 8.5–10.5)
CHLORIDE SERPL-SCNC: 105 MMOL/L — SIGNIFICANT CHANGE UP (ref 96–108)
CHLORIDE SERPL-SCNC: 110 MMOL/L — HIGH (ref 96–108)
CO2 SERPL-SCNC: 27 MMOL/L — SIGNIFICANT CHANGE UP (ref 22–31)
CO2 SERPL-SCNC: 28 MMOL/L — SIGNIFICANT CHANGE UP (ref 22–31)
CREAT SERPL-MCNC: 0.87 MG/DL — SIGNIFICANT CHANGE UP (ref 0.5–1.3)
CREAT SERPL-MCNC: 0.9 MG/DL — SIGNIFICANT CHANGE UP (ref 0.5–1.3)
GAS PNL BLDA: SIGNIFICANT CHANGE UP
GLUCOSE SERPL-MCNC: 114 MG/DL — HIGH (ref 70–99)
GLUCOSE SERPL-MCNC: 129 MG/DL — HIGH (ref 70–99)
HCO3 BLDA-SCNC: 24 MMOL/L — SIGNIFICANT CHANGE UP (ref 21–28)
HCT VFR BLD CALC: 21.7 % — LOW (ref 39–50)
HCT VFR BLD CALC: 26.5 % — LOW (ref 39–50)
HCT VFR BLD CALC: 29.4 % — LOW (ref 39–50)
HGB BLD-MCNC: 10.2 G/DL — LOW (ref 13–17)
HGB BLD-MCNC: 7.4 G/DL — LOW (ref 13–17)
HGB BLD-MCNC: 8.8 G/DL — LOW (ref 13–17)
INR BLD: 1.37 — HIGH (ref 0.88–1.16)
INR BLD: 1.58 — HIGH (ref 0.88–1.16)
LYMPHOCYTES # BLD AUTO: 17.2 % — SIGNIFICANT CHANGE UP (ref 13–44)
MAGNESIUM SERPL-MCNC: 2.6 MG/DL — HIGH (ref 1.6–2.4)
MAGNESIUM SERPL-MCNC: 2.8 MG/DL — HIGH (ref 1.6–2.4)
MCHC RBC-ENTMCNC: 28.8 PG — SIGNIFICANT CHANGE UP (ref 27–34)
MCHC RBC-ENTMCNC: 29.5 PG — SIGNIFICANT CHANGE UP (ref 27–34)
MCHC RBC-ENTMCNC: 29.7 PG — SIGNIFICANT CHANGE UP (ref 27–34)
MCHC RBC-ENTMCNC: 33.2 G/DL — SIGNIFICANT CHANGE UP (ref 32–36)
MCHC RBC-ENTMCNC: 34.1 G/DL — SIGNIFICANT CHANGE UP (ref 32–36)
MCHC RBC-ENTMCNC: 34.7 G/DL — SIGNIFICANT CHANGE UP (ref 32–36)
MCV RBC AUTO: 85.7 FL — SIGNIFICANT CHANGE UP (ref 80–100)
MCV RBC AUTO: 86.5 FL — SIGNIFICANT CHANGE UP (ref 80–100)
MCV RBC AUTO: 86.6 FL — SIGNIFICANT CHANGE UP (ref 80–100)
MONOCYTES NFR BLD AUTO: 15.2 % — HIGH (ref 2–14)
NEUTROPHILS NFR BLD AUTO: 67.5 % — SIGNIFICANT CHANGE UP (ref 43–77)
PCO2 BLDA: 39 MMHG — SIGNIFICANT CHANGE UP (ref 35–48)
PH BLDA: 7.41 — SIGNIFICANT CHANGE UP (ref 7.35–7.45)
PHOSPHATE SERPL-MCNC: 2.4 MG/DL — LOW (ref 2.5–4.5)
PHOSPHATE SERPL-MCNC: 6.1 MG/DL — HIGH (ref 2.5–4.5)
PLATELET # BLD AUTO: 109 K/UL — LOW (ref 150–400)
PLATELET # BLD AUTO: 79 K/UL — LOW (ref 150–400)
PLATELET # BLD AUTO: 82 K/UL — LOW (ref 150–400)
PO2 BLDA: 101 MMHG — SIGNIFICANT CHANGE UP (ref 83–108)
POTASSIUM SERPL-MCNC: 3.8 MMOL/L — SIGNIFICANT CHANGE UP (ref 3.5–5.3)
POTASSIUM SERPL-MCNC: 4 MMOL/L — SIGNIFICANT CHANGE UP (ref 3.5–5.3)
POTASSIUM SERPL-SCNC: 3.8 MMOL/L — SIGNIFICANT CHANGE UP (ref 3.5–5.3)
POTASSIUM SERPL-SCNC: 4 MMOL/L — SIGNIFICANT CHANGE UP (ref 3.5–5.3)
PROT SERPL-MCNC: 5 G/DL — LOW (ref 6.4–8.2)
PROTHROM AB SERPL-ACNC: 15.3 SEC — HIGH (ref 10–13.1)
PROTHROM AB SERPL-ACNC: 17.6 SEC — HIGH (ref 10–13.1)
RBC # BLD: 2.51 M/UL — LOW (ref 4.2–5.8)
RBC # BLD: 3.06 M/UL — LOW (ref 4.2–5.8)
RBC # BLD: 3.43 M/UL — LOW (ref 4.2–5.8)
RBC # FLD: 14.2 % — SIGNIFICANT CHANGE UP (ref 10.3–16.9)
RBC # FLD: 14.3 % — SIGNIFICANT CHANGE UP (ref 10.3–16.9)
RBC # FLD: 14.5 % — SIGNIFICANT CHANGE UP (ref 10.3–16.9)
SAO2 % BLDA: 97 % — SIGNIFICANT CHANGE UP (ref 95–100)
SODIUM SERPL-SCNC: 140 MMOL/L — SIGNIFICANT CHANGE UP (ref 135–145)
SODIUM SERPL-SCNC: 145 MMOL/L — SIGNIFICANT CHANGE UP (ref 135–145)
WBC # BLD: 10.1 K/UL — SIGNIFICANT CHANGE UP (ref 3.8–10.5)
WBC # BLD: 7.8 K/UL — SIGNIFICANT CHANGE UP (ref 3.8–10.5)
WBC # BLD: 9.1 K/UL — SIGNIFICANT CHANGE UP (ref 3.8–10.5)
WBC # FLD AUTO: 10.1 K/UL — SIGNIFICANT CHANGE UP (ref 3.8–10.5)
WBC # FLD AUTO: 7.8 K/UL — SIGNIFICANT CHANGE UP (ref 3.8–10.5)
WBC # FLD AUTO: 9.1 K/UL — SIGNIFICANT CHANGE UP (ref 3.8–10.5)

## 2017-03-02 PROCEDURE — 71010: CPT | Mod: 26

## 2017-03-02 RX ORDER — FUROSEMIDE 40 MG
20 TABLET ORAL ONCE
Qty: 0 | Refills: 0 | Status: COMPLETED | OUTPATIENT
Start: 2017-03-02 | End: 2017-03-02

## 2017-03-02 RX ORDER — POLYETHYLENE GLYCOL 3350 17 G/17G
17 POWDER, FOR SOLUTION ORAL DAILY
Qty: 0 | Refills: 0 | Status: DISCONTINUED | OUTPATIENT
Start: 2017-03-02 | End: 2017-03-05

## 2017-03-02 RX ORDER — SODIUM CHLORIDE 9 MG/ML
1000 INJECTION, SOLUTION INTRAVENOUS
Qty: 0 | Refills: 0 | Status: DISCONTINUED | OUTPATIENT
Start: 2017-03-02 | End: 2017-03-04

## 2017-03-02 RX ORDER — ALBUMIN HUMAN 25 %
250 VIAL (ML) INTRAVENOUS ONCE
Qty: 0 | Refills: 0 | Status: COMPLETED | OUTPATIENT
Start: 2017-03-02 | End: 2017-03-02

## 2017-03-02 RX ORDER — ASPIRIN/CALCIUM CARB/MAGNESIUM 324 MG
325 TABLET ORAL DAILY
Qty: 0 | Refills: 0 | Status: DISCONTINUED | OUTPATIENT
Start: 2017-03-02 | End: 2017-03-02

## 2017-03-02 RX ORDER — DOCUSATE SODIUM 100 MG
100 CAPSULE ORAL THREE TIMES A DAY
Qty: 0 | Refills: 0 | Status: DISCONTINUED | OUTPATIENT
Start: 2017-03-02 | End: 2017-03-05

## 2017-03-02 RX ORDER — INSULIN LISPRO 100/ML
VIAL (ML) SUBCUTANEOUS
Qty: 0 | Refills: 0 | Status: DISCONTINUED | OUTPATIENT
Start: 2017-03-02 | End: 2017-03-04

## 2017-03-02 RX ORDER — POTASSIUM CHLORIDE 20 MEQ
20 PACKET (EA) ORAL ONCE
Qty: 0 | Refills: 0 | Status: COMPLETED | OUTPATIENT
Start: 2017-03-02 | End: 2017-03-02

## 2017-03-02 RX ORDER — DEXTROSE 50 % IN WATER 50 %
1 SYRINGE (ML) INTRAVENOUS ONCE
Qty: 0 | Refills: 0 | Status: DISCONTINUED | OUTPATIENT
Start: 2017-03-02 | End: 2017-03-04

## 2017-03-02 RX ORDER — DEXTROSE 50 % IN WATER 50 %
25 SYRINGE (ML) INTRAVENOUS ONCE
Qty: 0 | Refills: 0 | Status: DISCONTINUED | OUTPATIENT
Start: 2017-03-02 | End: 2017-03-04

## 2017-03-02 RX ORDER — FAMOTIDINE 10 MG/ML
20 INJECTION INTRAVENOUS
Qty: 0 | Refills: 0 | Status: DISCONTINUED | OUTPATIENT
Start: 2017-03-02 | End: 2017-03-05

## 2017-03-02 RX ORDER — SODIUM CHLORIDE 9 MG/ML
3 INJECTION INTRAMUSCULAR; INTRAVENOUS; SUBCUTANEOUS EVERY 8 HOURS
Qty: 0 | Refills: 0 | Status: DISCONTINUED | OUTPATIENT
Start: 2017-03-02 | End: 2017-03-05

## 2017-03-02 RX ORDER — GLUCAGON INJECTION, SOLUTION 0.5 MG/.1ML
1 INJECTION, SOLUTION SUBCUTANEOUS ONCE
Qty: 0 | Refills: 0 | Status: DISCONTINUED | OUTPATIENT
Start: 2017-03-02 | End: 2017-03-04

## 2017-03-02 RX ORDER — POTASSIUM CHLORIDE 20 MEQ
40 PACKET (EA) ORAL ONCE
Qty: 0 | Refills: 0 | Status: COMPLETED | OUTPATIENT
Start: 2017-03-02 | End: 2017-03-02

## 2017-03-02 RX ORDER — ASPIRIN/CALCIUM CARB/MAGNESIUM 324 MG
325 TABLET ORAL DAILY
Qty: 0 | Refills: 0 | Status: DISCONTINUED | OUTPATIENT
Start: 2017-03-03 | End: 2017-03-05

## 2017-03-02 RX ORDER — ACETAMINOPHEN 500 MG
650 TABLET ORAL EVERY 6 HOURS
Qty: 0 | Refills: 0 | Status: DISCONTINUED | OUTPATIENT
Start: 2017-03-02 | End: 2017-03-05

## 2017-03-02 RX ORDER — SENNA PLUS 8.6 MG/1
2 TABLET ORAL AT BEDTIME
Qty: 0 | Refills: 0 | Status: DISCONTINUED | OUTPATIENT
Start: 2017-03-02 | End: 2017-03-05

## 2017-03-02 RX ORDER — DEXTROSE 50 % IN WATER 50 %
12.5 SYRINGE (ML) INTRAVENOUS ONCE
Qty: 0 | Refills: 0 | Status: DISCONTINUED | OUTPATIENT
Start: 2017-03-02 | End: 2017-03-04

## 2017-03-02 RX ADMIN — Medication 500 MILLILITER(S): at 10:00

## 2017-03-02 RX ADMIN — HEPARIN SODIUM 5000 UNIT(S): 5000 INJECTION INTRAVENOUS; SUBCUTANEOUS at 12:04

## 2017-03-02 RX ADMIN — SENNA PLUS 2 TABLET(S): 8.6 TABLET ORAL at 21:45

## 2017-03-02 RX ADMIN — Medication 30 MILLIGRAM(S): at 03:00

## 2017-03-02 RX ADMIN — Medication 650 MILLIGRAM(S): at 20:24

## 2017-03-02 RX ADMIN — SODIUM CHLORIDE 3 MILLILITER(S): 9 INJECTION INTRAMUSCULAR; INTRAVENOUS; SUBCUTANEOUS at 22:23

## 2017-03-02 RX ADMIN — Medication 100 MILLIGRAM(S): at 06:43

## 2017-03-02 RX ADMIN — Medication 100 MILLIGRAM(S): at 21:45

## 2017-03-02 RX ADMIN — HEPARIN SODIUM 5000 UNIT(S): 5000 INJECTION INTRAVENOUS; SUBCUTANEOUS at 06:43

## 2017-03-02 RX ADMIN — Medication 30 MILLIGRAM(S): at 03:10

## 2017-03-02 RX ADMIN — Medication 125 MILLILITER(S): at 07:00

## 2017-03-02 RX ADMIN — Medication 650 MILLIGRAM(S): at 12:35

## 2017-03-02 RX ADMIN — Medication 125 MILLILITER(S): at 06:50

## 2017-03-02 RX ADMIN — HEPARIN SODIUM 5000 UNIT(S): 5000 INJECTION INTRAVENOUS; SUBCUTANEOUS at 21:45

## 2017-03-02 RX ADMIN — Medication 40 MILLIEQUIVALENT(S): at 14:55

## 2017-03-02 RX ADMIN — Medication 50 GRAM(S): at 02:24

## 2017-03-02 RX ADMIN — Medication 325 MILLIGRAM(S): at 12:00

## 2017-03-02 RX ADMIN — FAMOTIDINE 20 MILLIGRAM(S): 10 INJECTION INTRAVENOUS at 06:43

## 2017-03-02 RX ADMIN — Medication 20 MILLIGRAM(S): at 07:00

## 2017-03-02 RX ADMIN — ATORVASTATIN CALCIUM 80 MILLIGRAM(S): 80 TABLET, FILM COATED ORAL at 21:45

## 2017-03-02 RX ADMIN — FAMOTIDINE 20 MILLIGRAM(S): 10 INJECTION INTRAVENOUS at 19:30

## 2017-03-02 RX ADMIN — FENTANYL CITRATE 25 MICROGRAM(S): 50 INJECTION INTRAVENOUS at 00:01

## 2017-03-02 RX ADMIN — Medication 100 MILLIGRAM(S): at 12:04

## 2017-03-02 RX ADMIN — FENTANYL CITRATE 25 MICROGRAM(S): 50 INJECTION INTRAVENOUS at 00:12

## 2017-03-02 RX ADMIN — Medication 100 MILLIEQUIVALENT(S): at 08:02

## 2017-03-02 RX ADMIN — Medication 650 MILLIGRAM(S): at 19:30

## 2017-03-02 RX ADMIN — Medication: at 15:00

## 2017-03-02 RX ADMIN — Medication 650 MILLIGRAM(S): at 12:05

## 2017-03-02 RX ADMIN — SODIUM CHLORIDE 3 MILLILITER(S): 9 INJECTION INTRAMUSCULAR; INTRAVENOUS; SUBCUTANEOUS at 14:07

## 2017-03-02 RX ADMIN — Medication 100 MILLIGRAM(S): at 12:06

## 2017-03-02 NOTE — CHART NOTE - NSCHARTNOTEFT_GEN_A_CORE
As discussed on morning rounds with Dr. Denny, b/l pleural CT and 2 mediastinal drains removed at the bedside without complication.  Patient tolerated procedure well.  Tie down and occlusive dressing in place.  Post procedure CXR without obvious PTX.

## 2017-03-02 NOTE — PROGRESS NOTE ADULT - SUBJECTIVE AND OBJECTIVE BOX
Patient discussed on morning rounds with Dr. Denny     Operation / Date: CABGx3, 3/1/17    SUBJECTIVE ASSESSMENT:  52 y/o M w/ PMH of HTN, HLD, known CAD s/p NSTEMI 7/2016 at [facility](RAMÍREZ prox and mRCA with known residual LAD disease) POD#1 from CABGx3 (normal EF). Pt doing well pt c/o mild incisional pain. Denies dizziness or SOB.     Vital Signs Last 24 Hrs  T(C): 37.1, Max: 37.7 (03-02 @ 05:00)  T(F): 98.8, Max: 99.8 (03-02 @ 05:00)  HR: 82 (76 - 94)  BP: 90/48 (80/43 - 140/59)  BP(mean): 58 (48 - 119)  RR: 24 (7 - 31)  SpO2: 100% (98% - 100%)  I&O's Detail  I & Os for 24h ending 02 Mar 2017 07:00  =============================================  IN:    IV PiggyBack: 650 ml    Albumin 5%  - 250 mL: 500 ml    Lactated Ringers IV Bolus: 500 ml    Oral Fluid: 380 ml    sodium chloride 0.45%.: 170 ml    insulin Infusion: 33.5 ml    Total IN: 2233.5 ml  ---------------------------------------------  OUT:    Indwelling Catheter - Urethral: 1854 ml    Chest Tube: 340 ml    Chest Tube: 310 ml    Total OUT: 2504 ml  ---------------------------------------------  Total NET: -270.5 ml    I & Os for current day (as of 02 Mar 2017 14:30)  =============================================  IN:    Albumin 5%  - 250 mL: 250 ml    IV PiggyBack: 100 ml    sodium chloride 0.45%.: 80 ml    Total IN: 430 ml  ---------------------------------------------  OUT:    Indwelling Catheter - Urethral: 271 ml    Chest Tube: 80 ml    Chest Tube: 30 ml    Total OUT: 381 ml  ---------------------------------------------  Total NET: 49 ml      CHEST TUBE:  No. .   KEYANNA DRAIN:  No.  EPICARDIAL WIRES: Yes  TIE DOWNS: Yes  MARTINEZ: No.    PHYSICAL EXAM:    General: NAD, sitting upright in chair, WN-WD  Neurological: A&Ox3, no focal deficits   Cardiovascular: RRR, +murmur appreciated   Respiratory: poor effort, decreased breath sounds at the based b/l   Gastrointestinal: No TTP, soft  Extremities: WWP, no edema or calf tenderness   Incision Sites: sternotomy surgical dressing CDI. Left EVH site CDI , with no erythema or drainage     LABS:                        7.4    7.8   )-----------( 79       ( 02 Mar 2017 14:02 )             21.7       COUMADIN:  No    PT/INR - ( 02 Mar 2017 14:02 )   PT: 17.6 sec;   INR: 1.58     PTT - ( 02 Mar 2017 14:02 )  PTT:41.1 sec    02 Mar 2017 14:02    140    |  105    |  14     ----------------------------<  129    3.8     |  27     |  0.90     Ca    7.4        02 Mar 2017 14:02  Phos  2.4       02 Mar 2017 14:02  Mg     2.6       02 Mar 2017 14:02    TPro  5.0    /  Alb  2.5    /  TBili  0.5    /  DBili  x      /  AST  46     /  ALT  35     /  AlkPhos  60     02 Mar 2017 03:00      MEDICATIONS  (STANDING):  sodium chloride 0.45%. 1000milliLiter(s) IV Continuous <Continuous>  heparin  Injectable 5000Unit(s) SubCutaneous every 8 hours  ceFAZolin   IVPB 2000milliGRAM(s) IV Intermittent every 8 hours  atorvastatin 80milliGRAM(s) Oral at bedtime  aspirin enteric coated 325milliGRAM(s) Oral daily  insulin lispro (HumaLOG) corrective regimen sliding scale  SubCutaneous Before meals and at bedtime  famotidine    Tablet 20milliGRAM(s) Oral two times a day  senna 2Tablet(s) Oral at bedtime  docusate sodium 100milliGRAM(s) Oral three times a day  sodium chloride 0.9% lock flush 3milliLiter(s) IV Push every 8 hours    MEDICATIONS  (PRN):  dextrose Gel 1Dose(s) Oral once PRN Blood Glucose LESS THAN 70 milliGRAM(s)/deciliter  glucagon  Injectable 1milliGRAM(s) IntraMuscular once PRN Glucose LESS THAN 70 milligrams/deciliter  acetaminophen   Tablet. 650milliGRAM(s) Oral every 6 hours PRN Mild Pain (1 - 3)  oxyCODONE  5 mG/acetaminophen 325 mG 1Tablet(s) Oral every 4 hours PRN Severe Pain (7 - 10)  polyethylene glycol 3350 17Gram(s) Oral daily PRN Constipation        RADIOLOGY & ADDITIONAL TESTS:  CXR 3/2/17: no obvious PTX    A/P: 52 y/o M w/ PMH of HTN, HLD, known CAD s/p NSTEMI 7/2016 at [facility](RAMÍREZ prox and mRCA with known residual LAD disease) POD#1 from CABGx3 (normal EF). Pt  transferred to  from 9E. H/H 7.4/21.7 asymptomatic will be transfused 1PRBC.     Neurovascular: No delirium. Pain well controlled with current regimen.  -PRN's.    Cardiovascular: Hemodynamically stable. HR controlled.  -continue to monitor BP/HR/Tele   -Continue ASA  - No need for plavix as per Dr. Denny  -asymptomatic hypotension today, holding starting BP until BP improves     Respiratory:   -wean O2  -Encourage C+DB and Use of IS 10x / hr while awake.  -PA/Lat ordered for tomorrow AM   -f/u PM CXR     GI: Stable.  -PPX- pepcid   -PO Diet.    Renal / : continue to trend daily labs   -Monitor renal function.  -Monitor I/O's.    Endocrine:    -continue RISS   -A1c- 6.2  -TSH 0.784    Hematologic: f/u PM labs 7.4/21.7  -give 1UPRBC today and f/u PM labs   -CBC.  -Coagulation Panel.    ID: no acute issues   -continue junaid-op ABX  -Temp.  -CBC.  -Observe for SIRS/Sepsis Syndrome.    Prophylaxis:  -DVT prophylaxis with 5000 SubQ Heparin q8h.  -SCD's    Disposition:  -transfer to  from

## 2017-03-02 NOTE — PHYSICAL THERAPY INITIAL EVALUATION ADULT - PERTINENT HX OF CURRENT PROBLEM, REHAB EVAL
50 y/o M with PMHx HTN, HLD, known CAD s/p NSTEMI 7/2016 at [facility](RAMÍREZ prox and mRCA with known residual LAD disease), who presented for a f/u visit in Dr. Ordaz's office with c/o non-radiating chest pain described as a "twinge" on ambulation of 1 flight of stairs relieved on its own and less severe as his symptoms when he had his NSTEMI in July 2016. Please see H and P for further info

## 2017-03-03 LAB
ANION GAP SERPL CALC-SCNC: 7 MMOL/L — LOW (ref 9–16)
APTT BLD: 40.4 SEC — HIGH (ref 27.5–37.4)
BUN SERPL-MCNC: 12 MG/DL — SIGNIFICANT CHANGE UP (ref 7–23)
CALCIUM SERPL-MCNC: 8 MG/DL — LOW (ref 8.5–10.5)
CHLORIDE SERPL-SCNC: 105 MMOL/L — SIGNIFICANT CHANGE UP (ref 96–108)
CO2 SERPL-SCNC: 27 MMOL/L — SIGNIFICANT CHANGE UP (ref 22–31)
CREAT SERPL-MCNC: 0.69 MG/DL — SIGNIFICANT CHANGE UP (ref 0.5–1.3)
GLUCOSE SERPL-MCNC: 144 MG/DL — HIGH (ref 70–99)
HCT VFR BLD CALC: 29 % — LOW (ref 39–50)
HGB BLD-MCNC: 9.7 G/DL — LOW (ref 13–17)
INR BLD: 1.46 — HIGH (ref 0.88–1.16)
MAGNESIUM SERPL-MCNC: 2.5 MG/DL — HIGH (ref 1.6–2.4)
MCHC RBC-ENTMCNC: 29.2 PG — SIGNIFICANT CHANGE UP (ref 27–34)
MCHC RBC-ENTMCNC: 33.4 G/DL — SIGNIFICANT CHANGE UP (ref 32–36)
MCV RBC AUTO: 87.3 FL — SIGNIFICANT CHANGE UP (ref 80–100)
PHOSPHATE SERPL-MCNC: 2.4 MG/DL — LOW (ref 2.5–4.5)
PLATELET # BLD AUTO: 90 K/UL — LOW (ref 150–400)
POTASSIUM SERPL-MCNC: 4.4 MMOL/L — SIGNIFICANT CHANGE UP (ref 3.5–5.3)
POTASSIUM SERPL-SCNC: 4.4 MMOL/L — SIGNIFICANT CHANGE UP (ref 3.5–5.3)
PROTHROM AB SERPL-ACNC: 16.3 SEC — HIGH (ref 10–13.1)
RBC # BLD: 3.32 M/UL — LOW (ref 4.2–5.8)
RBC # FLD: 14 % — SIGNIFICANT CHANGE UP (ref 10.3–16.9)
SODIUM SERPL-SCNC: 139 MMOL/L — SIGNIFICANT CHANGE UP (ref 135–145)
WBC # BLD: 10.8 K/UL — HIGH (ref 3.8–10.5)
WBC # FLD AUTO: 10.8 K/UL — HIGH (ref 3.8–10.5)

## 2017-03-03 PROCEDURE — 71020: CPT | Mod: 26

## 2017-03-03 PROCEDURE — 93010 ELECTROCARDIOGRAM REPORT: CPT

## 2017-03-03 RX ORDER — FUROSEMIDE 40 MG
20 TABLET ORAL DAILY
Qty: 0 | Refills: 0 | Status: DISCONTINUED | OUTPATIENT
Start: 2017-03-03 | End: 2017-03-05

## 2017-03-03 RX ORDER — METOPROLOL TARTRATE 50 MG
12.5 TABLET ORAL
Qty: 0 | Refills: 0 | Status: DISCONTINUED | OUTPATIENT
Start: 2017-03-04 | End: 2017-03-05

## 2017-03-03 RX ORDER — METOPROLOL TARTRATE 50 MG
25 TABLET ORAL DAILY
Qty: 0 | Refills: 0 | Status: DISCONTINUED | OUTPATIENT
Start: 2017-03-03 | End: 2017-03-03

## 2017-03-03 RX ADMIN — Medication 325 MILLIGRAM(S): at 12:50

## 2017-03-03 RX ADMIN — Medication 100 MILLIGRAM(S): at 22:33

## 2017-03-03 RX ADMIN — Medication 100 MILLIGRAM(S): at 14:53

## 2017-03-03 RX ADMIN — HEPARIN SODIUM 5000 UNIT(S): 5000 INJECTION INTRAVENOUS; SUBCUTANEOUS at 06:54

## 2017-03-03 RX ADMIN — SODIUM CHLORIDE 3 MILLILITER(S): 9 INJECTION INTRAMUSCULAR; INTRAVENOUS; SUBCUTANEOUS at 14:39

## 2017-03-03 RX ADMIN — FAMOTIDINE 20 MILLIGRAM(S): 10 INJECTION INTRAVENOUS at 19:04

## 2017-03-03 RX ADMIN — Medication 20 MILLIGRAM(S): at 11:45

## 2017-03-03 RX ADMIN — HEPARIN SODIUM 5000 UNIT(S): 5000 INJECTION INTRAVENOUS; SUBCUTANEOUS at 14:53

## 2017-03-03 RX ADMIN — SENNA PLUS 2 TABLET(S): 8.6 TABLET ORAL at 22:34

## 2017-03-03 RX ADMIN — SODIUM CHLORIDE 3 MILLILITER(S): 9 INJECTION INTRAMUSCULAR; INTRAVENOUS; SUBCUTANEOUS at 06:54

## 2017-03-03 RX ADMIN — FAMOTIDINE 20 MILLIGRAM(S): 10 INJECTION INTRAVENOUS at 06:53

## 2017-03-03 RX ADMIN — HEPARIN SODIUM 5000 UNIT(S): 5000 INJECTION INTRAVENOUS; SUBCUTANEOUS at 22:34

## 2017-03-03 RX ADMIN — Medication 25 MILLIGRAM(S): at 06:54

## 2017-03-03 RX ADMIN — Medication 100 MILLIGRAM(S): at 06:53

## 2017-03-03 RX ADMIN — ATORVASTATIN CALCIUM 80 MILLIGRAM(S): 80 TABLET, FILM COATED ORAL at 22:33

## 2017-03-03 RX ADMIN — SODIUM CHLORIDE 3 MILLILITER(S): 9 INJECTION INTRAMUSCULAR; INTRAVENOUS; SUBCUTANEOUS at 22:34

## 2017-03-03 NOTE — DISCHARGE NOTE ADULT - PATIENT PORTAL LINK FT
“You can access the [employer] Patient Portal, offered by [facility], by registering with the following website: [email or web address]”

## 2017-03-03 NOTE — PROGRESS NOTE ADULT - SUBJECTIVE AND OBJECTIVE BOX
Patient discussed on morning rounds with Dr. Denny     Operation / Date:  3/1/17 CABGx3     SUBJECTIVE ASSESSMENT:  Patient seen this morning at bedside, patient complaining of 5/10 incisional pain worsened with movement and coughing. Denies any chest pressure or pain at rest. Also complaining of some shortness of breath on ambulation.       Vital Signs Last 24 Hrs  T(C): 37.7, Max: 37.8 (03-03 @ 05:00)  T(F): 99.9, Max: 100 (03-03 @ 05:00)  HR: 86 (82 - 86)  BP: 108/55 (96/51 - 108/57)  BP(mean): 73 (67 - 77)  RR: 14 (14 - 16)  SpO2: 97% (97% - 98%)  I&O's Detail    I & Os for current day (as of 03 Mar 2017 16:54)  =============================================  IN:    Oral Fluid: 480 ml    Packed Red Blood Cells: 300 ml    Albumin 5%  - 250 mL: 250 ml    IV PiggyBack: 150 ml    sodium chloride 0.45%.: 130 ml    Total IN: 1310 ml  ---------------------------------------------  OUT:    Voided: 1300 ml    Indwelling Catheter - Urethral: 271 ml    Chest Tube: 80 ml    Chest Tube: 30 ml    Total OUT: 1681 ml  ---------------------------------------------  Total NET: -371 ml      CHEST TUBE: No  KEYANNA DRAIN: No  EPICARDIAL WIRES: Yes   TIE DOWNS: Yes  MARTINEZ: No.    PHYSICAL EXAM:    General: Patient lying comfortably in bed, no acute distress     Neurological: Alert and oriented. No focal neurological deficits     Cardiovascular: S1S2, RRR, no murmurs appreciated on exam     Respiratory: Clear to ausculation bilaterally     Gastrointestinal: Abdomen soft, non tender, non distended     Extremities: Warm and well perfused. No edema or calf tenderness    Vascular: Peripheral pulses 2+ bilaterally     Incision Sites: Sternotomy incision covered with mepilex dressing to be removed on POD#3   Left EVH site C/D/I, no drainage or surrounding erythema     LABS:                        9.7    10.8  )-----------( 90       ( 03 Mar 2017 06:30 )             29.0       COUMADIN:  No     PT/INR - ( 03 Mar 2017 06:30 )   PT: 16.3 sec;   INR: 1.46          PTT - ( 03 Mar 2017 06:30 )  PTT:40.4 sec    03 Mar 2017 06:30    139    |  105    |  12     ----------------------------<  144    4.4     |  27     |  0.69     Ca    8.0        03 Mar 2017 06:30  Phos  2.4       03 Mar 2017 06:30  Mg     2.5       03 Mar 2017 06:30    TPro  5.0    /  Alb  2.5    /  TBili  0.5    /  DBili  x      /  AST  46     /  ALT  35     /  AlkPhos  60     02 Mar 2017 03:00    MEDICATIONS  (STANDING):  sodium chloride 0.45%. 1000milliLiter(s) IV Continuous <Continuous>  heparin  Injectable 5000Unit(s) SubCutaneous every 8 hours  atorvastatin 80milliGRAM(s) Oral at bedtime  insulin lispro (HumaLOG) corrective regimen sliding scale  SubCutaneous Before meals and at bedtime  famotidine    Tablet 20milliGRAM(s) Oral two times a day  senna 2Tablet(s) Oral at bedtime  docusate sodium 100milliGRAM(s) Oral three times a day  sodium chloride 0.9% lock flush 3milliLiter(s) IV Push every 8 hours  aspirin enteric coated 325milliGRAM(s) Oral daily  metoprolol succinate ER 25milliGRAM(s) Oral daily  furosemide    Tablet 20milliGRAM(s) Oral daily    MEDICATIONS  (PRN):  acetaminophen   Tablet. 650milliGRAM(s) Oral every 6 hours PRN Mild Pain (1 - 3)  polyethylene glycol 3350 17Gram(s) Oral daily PRN Constipation  oxyCODONE  5 mG/acetaminophen 325 mG 1Tablet(s) Oral every 4 hours PRN Moderate Pain (4 - 6)  oxyCODONE  5 mG/acetaminophen 325 mG 2Tablet(s) Oral every 4 hours PRN Severe Pain (7 - 10)    RADIOLOGY & ADDITIONAL TESTS:  3/3/17 CXR Pa/Lat: New bilateral pleural effusions. Worsening bibasilar opacities left greater than right concerning for a developing left lower lobe infiltrate.    A/P: 51 year old male PMHx HTN, HLD, CAD hx NSTEMI 7/2016s/p PCI with RAMÍREZ to prox and mRCA who presented his cardiologist c/o non radiating chest pain. Patient referred for cardiac cath which showed 3VCAD. Dr. Denny was consulted and patient underwent OPCAB x 3 on 3/1/17. Procedure was uncomplicated, patient received 1u prbc yesterday for H/H 7.4/21 and transferred to . No acute events, patient doing well.     Neurovascular: Stable. Pain well controlled with current regimen.  - continue percocet and tylenol PRN     Cardiovascular: Hemodynamically stable. HR controlled.  - 3v CAD s/p OPCAB x 3 - continue asa 325mg, metoprolol 12.5 BID and atorvastatin 80mg qhs     Respiratory: 02 Sat = 97% on RA.  - Encourage ambulation   - Encourage C+DB and Use of IS 10x / hr while awake.  -CXR demonstrates possible left sided infiltrate and bilateral pleural effusions. Possibly atelectasis, encourage chest PT and incentive spirometer. f/u CXR in AM and Pa/lat Sunday     GI: Stable.  - continue DASH diet   - continue pepcid 20mg for GI ppx   - continue bowel regimen     Renal / : Cr 0.69  - Patient complaining of erectile dysfunction post anesthesia, consult urology in AM   - Monitor renal function.  - Monitor I/O's.    Endocrine:  Hemoglobin A1c 6.2   - continue insulin sliding scale   - monitor finger sticks     Hematologic: received 1u prbc h/h 9.7/29 today   - trend CBC     ID: CXR shows possible left sided infiltrate, patient asymptomatic. Afebrile WBC 10.2   - trend CBC for possible infection   -Observe for SIRS/Sepsis Syndrome.    Prophylaxis:  -DVT prophylaxis with 5000 SubQ Heparin q8h.  -SCD's    Disposition: Home when medically ready

## 2017-03-03 NOTE — DISCHARGE NOTE ADULT - MEDICATION SUMMARY - MEDICATIONS TO TAKE
I will START or STAY ON the medications listed below when I get home from the hospital:    ibuprofen 400 mg oral tablet  -- 1 tab(s) by mouth every 6 hours, As needed, moderate pain  -- Indication: For as needed for pain    acetaminophen 325 mg oral tablet  -- 2 tab(s) by mouth every 6 hours, As needed, Mild Pain (1 - 3)  -- Indication: For as needed for pain    acetaminophen-oxyCODONE 325 mg-5 mg oral tablet  -- 1 tab(s) by mouth every 6 hours, As Needed, severe Pain  MDD:3 tablets  -- Indication: For as needed for serve pain    aspirin 325 mg oral delayed release tablet  -- 1 tab(s) by mouth once a day  -- Indication: For heart disease    atorvastatin 80 mg oral tablet  -- 1 tab(s) by mouth once a day  -- Indication: For Cholesterol    metoprolol tartrate 25 mg oral tablet  -- 0.5 tab(s) by mouth 2 times a day  -- It is very important that you take or use this exactly as directed.  Do not skip doses or discontinue unless directed by your doctor.  May cause drowsiness.  Alcohol may intensify this effect.  Use care when operating dangerous machinery.  Some non-prescription drugs may aggravate your condition.  Read all labels carefully.  If a warning appears, check with your doctor before taking.  Take with food or milk.  This drug may impair the ability to drive or operate machinery.  Use care until you become familiar with its effects.    -- Indication: For blood pressure     furosemide 20 mg oral tablet  -- 1 tab(s) by mouth once a day  -- Indication: For blood pressure    polyethylene glycol 3350 oral powder for reconstitution  -- 17 gram(s) by mouth once a day, As needed, Constipation    dispense one month supply  -- Indication: For as needed for constipation

## 2017-03-03 NOTE — DISCHARGE NOTE ADULT - HOSPITAL COURSE
This is a 50 y/o M with PMHx HTN, HLD, NSTEMI s/p (RAMÍREZ prox and mRCA with known residual LAD disease, non-compliant on medications due to insurance issues) 7/2016  [facility], who presented for a f/u visit in Dr. Ordaz's office with c/o CP. Cardiac catheterization revealed 3 vessel CAD and pt was referred to Dr. Denny for surgical evaluation. Pt was deemed a surgical candidate and underwent elective CABGx3 (LIMA - LAD, SVG - RCA, SVG - Ramus, EF 60%) on 3/1/17 with Dr. Denny. Intra-op pt was given 1UPRBC and post-op was transferred to CTICU intubated. POD#0 pt was extubated. POD#1, pt was transferred to 9L and was given 1UPRBC with appropriate response. POD#2, pt had asymptomatic fever, CXR revealed pleural effusions VS atelectasis. pt encouraged to ambulated and increase use of incentive spirometer and fever resolved. POD#4, as per  pt stable and ready for discharge home.

## 2017-03-03 NOTE — DISCHARGE NOTE ADULT - CARE PLAN
Principal Discharge DX:	CAD (coronary artery disease)  Goal:	s/p CABG  Instructions for follow-up, activity and diet:	-Please call the office on Monday 3/6/17 to schedule a follow-up appointment with Dr. Denny within 1 week of discharge.  The office is located at [facility], [facility], 4th floor. Call us with any questions #[phone]. Prompt #2   -Your Hemaglobin A1c was 6.1 on admission which is a marker for pre-diabetes. Please maintain a low sugar diet at home and make an appointment with your primary care provider within 1-2 weeks for discussion/educate about diabetes and possible management.  -Walk daily as tolerated and use your incentive spirometer ten times every hour.  -No driving or strenuous activity/exercise for 6 weeks, or until cleared by your surgeon. Please do not lift anything greater than ten pounds.   -Gently clean your incisions with anti-bacterial soap and water, pat dry.  You may leave them open to air.  -Call your doctor if you have shortness of breath, chest pain not relieved by pain medication, dizziness, fever >101.5, or increased redness or drainage from incisions. Principal Discharge DX:	CAD (coronary artery disease)  Goal:	s/p CABG  Instructions for follow-up, activity and diet:	-Please call the office on Monday 3/6/17 to schedule a follow-up appointment with Dr. Denny within 1 week of discharge.  The office is located at [facility], [facility], 4th floor. Call us with any questions #[phone]. Prompt #2   -Your Hemaglobin A1c was 6.1 on admission which is a marker for pre-diabetes. Please maintain a low sugar diet at home and make an appointment with your primary care provider within 1-2 weeks for discussion/educate about diabetes and possible management.  -Walk daily as tolerated and use your incentive spirometer ten times every hour.  -No driving or strenuous activity/exercise for 6 weeks, or until cleared by your surgeon. Please do not lift anything greater than ten pounds.   -Gently clean your incisions with anti-bacterial soap and water, pat dry.  You may leave them open to air.  -Call your doctor if you have shortness of breath, chest pain not relieved by pain medication, dizziness, fever >101.5, or increased redness or drainage from incisions. Principal Discharge DX:	CAD (coronary artery disease)  Goal:	s/p CABG  Instructions for follow-up, activity and diet:	-Please call the office on Monday 3/6/17 to schedule a follow-up appointment with Dr. Denny within 1 week of discharge.  The office is located at [facility], [facility], 4th floor. Call us with any questions #[phone]. Prompt #2   -Your Hemaglobin A1c was 6.1 on admission which is a marker for pre-diabetes. Please maintain a low sugar diet at home and make an appointment with your primary care provider within 1-2 weeks for discussion/educate about diabetes and possible management.  -Walk daily as tolerated and use your incentive spirometer ten times every hour.  -No driving or strenuous activity/exercise for 6 weeks, or until cleared by your surgeon. Please do not lift anything greater than ten pounds.   -Gently clean your incisions with anti-bacterial soap and water, pat dry.  You may leave them open to air.  -Call your doctor if you have shortness of breath, chest pain not relieved by pain medication, dizziness, fever >101.5, or increased redness or drainage from incisions.

## 2017-03-03 NOTE — DISCHARGE NOTE ADULT - PLAN OF CARE
-Please call the office on Monday 3/6/17 to schedule a follow-up appointment with Dr. Denny within 1 week of discharge.  The office is located at [facility], [facility], 4th floor. Call us with any questions #[phone]. Prompt #2   -Your Hemaglobin A1c was 6.1 on admission which is a marker for pre-diabetes. Please maintain a low sugar diet at home and make an appointment with your primary care provider within 1-2 weeks for discussion/educate about diabetes and possible management.  -Walk daily as tolerated and use your incentive spirometer ten times every hour.  -No driving or strenuous activity/exercise for 6 weeks, or until cleared by your surgeon. Please do not lift anything greater than ten pounds.   -Gently clean your incisions with anti-bacterial soap and water, pat dry.  You may leave them open to air.  -Call your doctor if you have shortness of breath, chest pain not relieved by pain medication, dizziness, fever >101.5, or increased redness or drainage from incisions. s/p CABG

## 2017-03-03 NOTE — DISCHARGE NOTE ADULT - CARE PROVIDER_API CALL
Presley Denny), Cardiovascular Surgery  [address] [address]  Phone: [phone]  Fax: [phone]

## 2017-03-03 NOTE — DISCHARGE NOTE ADULT - CARE PROVIDERS DIRECT ADDRESSES
[email or web address]

## 2017-03-03 NOTE — DISCHARGE NOTE ADULT - MEDICATION SUMMARY - MEDICATIONS TO STOP TAKING
I will STOP taking the medications listed below when I get home from the hospital:    lisinopril 2.5 mg oral tablet  -- 1 tab(s) by mouth once a day    Plavix 75 mg oral tablet  -- 1 tab(s) by mouth once a day

## 2017-03-04 LAB
ANION GAP SERPL CALC-SCNC: 6 MMOL/L — LOW (ref 9–16)
BUN SERPL-MCNC: 14 MG/DL — SIGNIFICANT CHANGE UP (ref 7–23)
CALCIUM SERPL-MCNC: 7.9 MG/DL — LOW (ref 8.5–10.5)
CHLORIDE SERPL-SCNC: 102 MMOL/L — SIGNIFICANT CHANGE UP (ref 96–108)
CO2 SERPL-SCNC: 29 MMOL/L — SIGNIFICANT CHANGE UP (ref 22–31)
CREAT SERPL-MCNC: 0.92 MG/DL — SIGNIFICANT CHANGE UP (ref 0.5–1.3)
GLUCOSE SERPL-MCNC: 123 MG/DL — HIGH (ref 70–99)
HCT VFR BLD CALC: 24.6 % — LOW (ref 39–50)
HCT VFR BLD CALC: 25 % — LOW (ref 39–50)
HGB BLD-MCNC: 8.3 G/DL — LOW (ref 13–17)
HGB BLD-MCNC: 8.5 G/DL — LOW (ref 13–17)
MAGNESIUM SERPL-MCNC: 2.3 MG/DL — SIGNIFICANT CHANGE UP (ref 1.6–2.4)
MCHC RBC-ENTMCNC: 29.2 PG — SIGNIFICANT CHANGE UP (ref 27–34)
MCHC RBC-ENTMCNC: 29.7 PG — SIGNIFICANT CHANGE UP (ref 27–34)
MCHC RBC-ENTMCNC: 33.7 G/DL — SIGNIFICANT CHANGE UP (ref 32–36)
MCHC RBC-ENTMCNC: 34 G/DL — SIGNIFICANT CHANGE UP (ref 32–36)
MCV RBC AUTO: 86.6 FL — SIGNIFICANT CHANGE UP (ref 80–100)
MCV RBC AUTO: 87.4 FL — SIGNIFICANT CHANGE UP (ref 80–100)
PHOSPHATE SERPL-MCNC: 2.4 MG/DL — LOW (ref 2.5–4.5)
PLATELET # BLD AUTO: 118 K/UL — LOW (ref 150–400)
PLATELET # BLD AUTO: 140 K/UL — LOW (ref 150–400)
POTASSIUM SERPL-MCNC: 3.7 MMOL/L — SIGNIFICANT CHANGE UP (ref 3.5–5.3)
POTASSIUM SERPL-SCNC: 3.7 MMOL/L — SIGNIFICANT CHANGE UP (ref 3.5–5.3)
RBC # BLD: 2.84 M/UL — LOW (ref 4.2–5.8)
RBC # BLD: 2.86 M/UL — LOW (ref 4.2–5.8)
RBC # FLD: 13 % — SIGNIFICANT CHANGE UP (ref 10.3–16.9)
RBC # FLD: 13.7 % — SIGNIFICANT CHANGE UP (ref 10.3–16.9)
SODIUM SERPL-SCNC: 137 MMOL/L — SIGNIFICANT CHANGE UP (ref 135–145)
WBC # BLD: 8.5 K/UL — SIGNIFICANT CHANGE UP (ref 3.8–10.5)
WBC # BLD: 9.6 K/UL — SIGNIFICANT CHANGE UP (ref 3.8–10.5)
WBC # FLD AUTO: 8.5 K/UL — SIGNIFICANT CHANGE UP (ref 3.8–10.5)
WBC # FLD AUTO: 9.6 K/UL — SIGNIFICANT CHANGE UP (ref 3.8–10.5)

## 2017-03-04 PROCEDURE — 71010: CPT | Mod: 26

## 2017-03-04 RX ORDER — IBUPROFEN 200 MG
400 TABLET ORAL EVERY 6 HOURS
Qty: 0 | Refills: 0 | Status: DISCONTINUED | OUTPATIENT
Start: 2017-03-04 | End: 2017-03-05

## 2017-03-04 RX ORDER — POTASSIUM CHLORIDE 20 MEQ
40 PACKET (EA) ORAL ONCE
Qty: 0 | Refills: 0 | Status: COMPLETED | OUTPATIENT
Start: 2017-03-04 | End: 2017-03-04

## 2017-03-04 RX ADMIN — FAMOTIDINE 20 MILLIGRAM(S): 10 INJECTION INTRAVENOUS at 06:00

## 2017-03-04 RX ADMIN — Medication 40 MILLIEQUIVALENT(S): at 14:30

## 2017-03-04 RX ADMIN — Medication 100 MILLIGRAM(S): at 21:28

## 2017-03-04 RX ADMIN — HEPARIN SODIUM 5000 UNIT(S): 5000 INJECTION INTRAVENOUS; SUBCUTANEOUS at 21:27

## 2017-03-04 RX ADMIN — HEPARIN SODIUM 5000 UNIT(S): 5000 INJECTION INTRAVENOUS; SUBCUTANEOUS at 05:59

## 2017-03-04 RX ADMIN — SENNA PLUS 2 TABLET(S): 8.6 TABLET ORAL at 21:28

## 2017-03-04 RX ADMIN — ATORVASTATIN CALCIUM 80 MILLIGRAM(S): 80 TABLET, FILM COATED ORAL at 21:28

## 2017-03-04 RX ADMIN — Medication 12.5 MILLIGRAM(S): at 17:46

## 2017-03-04 RX ADMIN — Medication 100 MILLIGRAM(S): at 14:31

## 2017-03-04 RX ADMIN — Medication 100 MILLIGRAM(S): at 05:59

## 2017-03-04 RX ADMIN — Medication 325 MILLIGRAM(S): at 11:28

## 2017-03-04 RX ADMIN — SODIUM CHLORIDE 3 MILLILITER(S): 9 INJECTION INTRAMUSCULAR; INTRAVENOUS; SUBCUTANEOUS at 05:59

## 2017-03-04 RX ADMIN — HEPARIN SODIUM 5000 UNIT(S): 5000 INJECTION INTRAVENOUS; SUBCUTANEOUS at 14:30

## 2017-03-04 RX ADMIN — Medication 20 MILLIGRAM(S): at 05:59

## 2017-03-04 RX ADMIN — SODIUM CHLORIDE 3 MILLILITER(S): 9 INJECTION INTRAMUSCULAR; INTRAVENOUS; SUBCUTANEOUS at 21:26

## 2017-03-04 RX ADMIN — Medication 12.5 MILLIGRAM(S): at 05:58

## 2017-03-04 RX ADMIN — FAMOTIDINE 20 MILLIGRAM(S): 10 INJECTION INTRAVENOUS at 17:45

## 2017-03-04 RX ADMIN — SODIUM CHLORIDE 3 MILLILITER(S): 9 INJECTION INTRAMUSCULAR; INTRAVENOUS; SUBCUTANEOUS at 14:31

## 2017-03-04 NOTE — PROGRESS NOTE ADULT - SUBJECTIVE AND OBJECTIVE BOX
Patient discussed on morning rounds with Dr. Hines     Operation / Date: CABGx3 3/1/17    SUBJECTIVE ASSESSMENT:  51y Male  POD#3 from CABNGx3. febrile overnight, encouraged ambulation and IS. pt denies SOB, cough or CP.     Vital Signs Last 24 Hrs  T(C): 37.2, Max: 38.7 (03-03 @ 19:33)  T(F): 99, Max: 101.7 (03-03 @ 19:33)  HR: 88 (86 - 96)  BP: 127/59 (97/54 - 137/59)  BP(mean): 85 (70 - 85)  RR: 16 (15 - 16)  SpO2: 97% (95% - 97%)  I&O's Detail  I & Os for 24h ending 04 Mar 2017 07:00  =============================================  IN:    Total IN: 0 ml  ---------------------------------------------  OUT:    Voided: 1575 ml    Total OUT: 1575 ml  ---------------------------------------------  Total NET: -1575 ml    I & Os for current day (as of 04 Mar 2017 11:51)  =============================================  IN:    Oral Fluid: 250 ml    Total IN: 250 ml  ---------------------------------------------  OUT:    Total OUT: 0 ml  ---------------------------------------------  Total NET: 250 ml      CHEST TUBE:  Yes/No. AIR LEAKS: Yes/No. Suction / H2O SEAL.   KEYANNA DRAIN:  Yes/No.  EPICARDIAL WIRES: Yes/No.  TIE DOWNS: Yes/No.  MARTINEZ: Yes/No.    PHYSICAL EXAM:    General:   Neurological:  Cardiovascular:  Respiratory:  Gastrointestinal:  Extremities:  Vascular:  Incision Sites:    LABS:                        8.3    9.6   )-----------( 118      ( 04 Mar 2017 06:48 )             24.6       COUMADIN:  No    PT/INR - ( 03 Mar 2017 06:30 )   PT: 16.3 sec;   INR: 1.46     PTT - ( 03 Mar 2017 06:30 )  PTT:40.4 sec    04 Mar 2017 06:43    137    |  102    |  14     ----------------------------<  123    3.7     |  29     |  0.92     Ca    7.9        04 Mar 2017 06:43  Phos  2.4       04 Mar 2017 06:43  Mg     2.3       04 Mar 2017 06:43      MEDICATIONS  (STANDING):  sodium chloride 0.45%. 1000milliLiter(s) IV Continuous <Continuous>  heparin  Injectable 5000Unit(s) SubCutaneous every 8 hours  atorvastatin 80milliGRAM(s) Oral at bedtime  insulin lispro (HumaLOG) corrective regimen sliding scale  SubCutaneous Before meals and at bedtime  dextrose 5%. 1000milliLiter(s) IV Continuous <Continuous>  dextrose 50% Injectable 12.5Gram(s) IV Push once  dextrose 50% Injectable 25Gram(s) IV Push once  dextrose 50% Injectable 25Gram(s) IV Push once  famotidine    Tablet 20milliGRAM(s) Oral two times a day  senna 2Tablet(s) Oral at bedtime  docusate sodium 100milliGRAM(s) Oral three times a day  sodium chloride 0.9% lock flush 3milliLiter(s) IV Push every 8 hours  aspirin enteric coated 325milliGRAM(s) Oral daily  furosemide    Tablet 20milliGRAM(s) Oral daily  metoprolol 12.5milliGRAM(s) Oral two times a day    MEDICATIONS  (PRN):  dextrose Gel 1Dose(s) Oral once PRN Blood Glucose LESS THAN 70 milliGRAM(s)/deciliter  glucagon  Injectable 1milliGRAM(s) IntraMuscular once PRN Glucose LESS THAN 70 milligrams/deciliter  acetaminophen   Tablet. 650milliGRAM(s) Oral every 6 hours PRN Mild Pain (1 - 3)  polyethylene glycol 3350 17Gram(s) Oral daily PRN Constipation  oxyCODONE  5 mG/acetaminophen 325 mG 1Tablet(s) Oral every 4 hours PRN Moderate Pain (4 - 6)  oxyCODONE  5 mG/acetaminophen 325 mG 2Tablet(s) Oral every 4 hours PRN Severe Pain (7 - 10)      RADIOLOGY & ADDITIONAL TESTS:  3/4/17 CXR: b/l pleural effusions/atelectasis      A/P: 51 year old male PMHx HTN, HLD, CAD hx NSTEMI 7/2016s/p PCI with RAMÍREZ to prox and mRCA who presented his cardiologist c/o non radiating chest pain. Patient referred for cardiac cath which showed 3VCAD. Dr. Denny was consulted and patient underwent OPCAB x 3 on 3/1/17. Procedure was uncomplicated. Pt with intermittent fever likely from atelectasis. Continue to monitor, possible D/C home tomorrow or monday.    Neurovascular: No delirium. Pain well controlled with current regimen.  -continue current pain regimen     Cardiovascular: Hemodynamically stable. HR controlled.  -continue to monitor BP/HR/Tele   -continue lasix and lopressor 12.5mg PO QD and lipitor and ASA 325mg PO QD    Respiratory: Sat 97% on RA   -Encourage C+DB and Use of IS 10x / hr while awake.  -f/u AM PA/LAT CXR     GI: Stable.  -PPX with pepcid   -PO Diet.    Renal / : continue to monitor daily labs   -repleted potassium   -Monitor renal function.  -Monitor I/O's.    Endocrine:    -A1c- 6.2  -d/c'ed RISS, non-diabetic a1c is <6.5. Pt encouraged to eat low sugar diet and follow-up with primary care provider for pre-diabetes   -TSH- 0.78    Hematologic: H/H at 8.3/24.6, continue to trend daily labs   -plt improved to 118 from 90  -CBC  -Coagulation Panel.    ID: continue to monitor Temp, fever this .5. Atelectasis on CXR encouraging ambulation and IS   -WBC downtrending to 9.6 from 10.8  -Observe for SIRS/Sepsis Syndrome.    Prophylaxis:  -DVT prophylaxis with 5000 SubQ Heparin q8h.  -SCD's    Disposition:  -D/C home Sunday/Monday Patient discussed on morning rounds with Dr. Hines     Operation / Date: CABGx3 3/1/17    SUBJECTIVE ASSESSMENT:  51y Male  POD#3 from CABNGx3. febrile overnight, encouraged ambulation and IS. pt denies SOB, cough or CP.     Vital Signs Last 24 Hrs  T(C): 37.2, Max: 38.7 (03-03 @ 19:33)  T(F): 99, Max: 101.7 (03-03 @ 19:33)  HR: 88 (86 - 96)  BP: 127/59 (97/54 - 137/59)  BP(mean): 85 (70 - 85)  RR: 16 (15 - 16)  SpO2: 97% (95% - 97%)  I&O's Detail  I & Os for 24h ending 04 Mar 2017 07:00  =============================================  IN:    Total IN: 0 ml  ---------------------------------------------  OUT:    Voided: 1575 ml    Total OUT: 1575 ml  ---------------------------------------------  Total NET: -1575 ml    I & Os for current day (as of 04 Mar 2017 11:51)  =============================================  IN:    Oral Fluid: 250 ml    Total IN: 250 ml  ---------------------------------------------  OUT:    Total OUT: 0 ml  ---------------------------------------------  Total NET: 250 ml      CHEST TUBE:  Yes/No. AIR LEAKS: Yes/No. Suction / H2O SEAL.   KEYANNA DRAIN:  Yes/No.  EPICARDIAL WIRES: Yes/No.  TIE DOWNS: Yes/No.  MARTINEZ: Yes/No.    PHYSICAL EXAM:    General:   Neurological:  Cardiovascular:  Respiratory:  Gastrointestinal:  Extremities:  Vascular:  Incision Sites:    LABS:                        8.3    9.6   )-----------( 118      ( 04 Mar 2017 06:48 )             24.6       COUMADIN:  No    PT/INR - ( 03 Mar 2017 06:30 )   PT: 16.3 sec;   INR: 1.46     PTT - ( 03 Mar 2017 06:30 )  PTT:40.4 sec    04 Mar 2017 06:43    137    |  102    |  14     ----------------------------<  123    3.7     |  29     |  0.92     Ca    7.9        04 Mar 2017 06:43  Phos  2.4       04 Mar 2017 06:43  Mg     2.3       04 Mar 2017 06:43      MEDICATIONS  (STANDING):  sodium chloride 0.45%. 1000milliLiter(s) IV Continuous <Continuous>  heparin  Injectable 5000Unit(s) SubCutaneous every 8 hours  atorvastatin 80milliGRAM(s) Oral at bedtime  insulin lispro (HumaLOG) corrective regimen sliding scale  SubCutaneous Before meals and at bedtime  dextrose 5%. 1000milliLiter(s) IV Continuous <Continuous>  dextrose 50% Injectable 12.5Gram(s) IV Push once  dextrose 50% Injectable 25Gram(s) IV Push once  dextrose 50% Injectable 25Gram(s) IV Push once  famotidine    Tablet 20milliGRAM(s) Oral two times a day  senna 2Tablet(s) Oral at bedtime  docusate sodium 100milliGRAM(s) Oral three times a day  sodium chloride 0.9% lock flush 3milliLiter(s) IV Push every 8 hours  aspirin enteric coated 325milliGRAM(s) Oral daily  furosemide    Tablet 20milliGRAM(s) Oral daily  metoprolol 12.5milliGRAM(s) Oral two times a day    MEDICATIONS  (PRN):  dextrose Gel 1Dose(s) Oral once PRN Blood Glucose LESS THAN 70 milliGRAM(s)/deciliter  glucagon  Injectable 1milliGRAM(s) IntraMuscular once PRN Glucose LESS THAN 70 milligrams/deciliter  acetaminophen   Tablet. 650milliGRAM(s) Oral every 6 hours PRN Mild Pain (1 - 3)  polyethylene glycol 3350 17Gram(s) Oral daily PRN Constipation  oxyCODONE  5 mG/acetaminophen 325 mG 1Tablet(s) Oral every 4 hours PRN Moderate Pain (4 - 6)  oxyCODONE  5 mG/acetaminophen 325 mG 2Tablet(s) Oral every 4 hours PRN Severe Pain (7 - 10)      RADIOLOGY & ADDITIONAL TESTS:  3/4/17 CXR: b/l pleural effusions/atelectasis      A/P: 51 year old male PMHx HTN, HLD, CAD hx NSTEMI 7/2016s/p PCI with RAMÍREZ to prox and mRCA who presented his cardiologist c/o non radiating chest pain. Patient referred for cardiac cath which showed 3VCAD. Dr. Denny was consulted and patient underwent OPCAB x 3 on 3/1/17. Procedure was uncomplicated. Pt with intermittent fever likely from atelectasis. Continue to monitor, possible D/C home tomorrow or monday.    Neurovascular: No delirium. Pain well controlled with current regimen.  -continue current pain regimen     Cardiovascular: Hemodynamically stable. HR controlled.  -continue to monitor BP/HR/Tele   -continue lasix and lopressor 12.5mg PO QD and lipitor and ASA 325mg PO QD    Respiratory: Sat 97% on RA   -Encourage C+DB and Use of IS 10x / hr while awake.  -f/u AM PA/LAT CXR     GI: Stable.  -PPX with pepcid   -PO Diet.    Renal / : continue to monitor daily labs   -repleted potassium   -Monitor renal function.  -pt w/ h/o ED will need to follow-up with outpatient PCP or urologist   -Monitor I/O's.    Endocrine:    -A1c- 6.2  -d/c'ed RISS, non-diabetic a1c is <6.5. Pt encouraged to eat low sugar diet and follow-up with primary care provider for pre-diabetes   -TSH- 0.78    Hematologic: H/H at 8.3/24.6, continue to trend daily labs   -plt improved to 118 from 90  -CBC  -Coagulation Panel.    ID: continue to monitor Temp, fever this .5. Atelectasis on CXR encouraging ambulation and IS   -WBC downtrending to 9.6 from 10.8  -Observe for SIRS/Sepsis Syndrome.    Prophylaxis:  -DVT prophylaxis with 5000 SubQ Heparin q8h.  -SCD's    Disposition:  -D/C home Sunday/Monday Patient discussed on morning rounds with Dr. Hines     Operation / Date: CABGx3 3/1/17    SUBJECTIVE ASSESSMENT:  51y Male  POD#3 from CABNGx3. febrile overnight, encouraged ambulation and IS. pt denies SOB, cough or CP.     Vital Signs Last 24 Hrs  T(C): 37.2, Max: 38.7 (03-03 @ 19:33)  T(F): 99, Max: 101.7 (03-03 @ 19:33)  HR: 88 (86 - 96)  BP: 127/59 (97/54 - 137/59)  BP(mean): 85 (70 - 85)  RR: 16 (15 - 16)  SpO2: 97% (95% - 97%)  I&O's Detail  I & Os for 24h ending 04 Mar 2017 07:00  =============================================  IN:    Total IN: 0 ml  ---------------------------------------------  OUT:    Voided: 1575 ml    Total OUT: 1575 ml  ---------------------------------------------  Total NET: -1575 ml    I & Os for current day (as of 04 Mar 2017 11:51)  =============================================  IN:    Oral Fluid: 250 ml    Total IN: 250 ml  ---------------------------------------------  OUT:    Total OUT: 0 ml  ---------------------------------------------  Total NET: 250 ml      CHEST TUBE:  No.   KEYANNA DRAIN:  No.  EPICARDIAL WIRES: Yes  TIE DOWNS: Yes.  MARTINEZ: No.    PHYSICAL EXAM:    General: NAD, sitting upright in chair   Neurological: moving all extremities, no focal deficits  Cardiovascular: RRR, no m/r/g  Respiratory: Decreased breath sounds with mild crackles at the bases b/l   Gastrointestinal: NT-ND, soft  Extremities: WWP, no calf tenderness b/l +1edema to LLE around EVH site , RLE no edema   Incision Sites: sternotomy CDI, no erythema or drianage. Left EVH site with localized edema and ecchymosis extending to medial thigh    LABS:                        8.3    9.6   )-----------( 118      ( 04 Mar 2017 06:48 )             24.6       COUMADIN:  No    PT/INR - ( 03 Mar 2017 06:30 )   PT: 16.3 sec;   INR: 1.46     PTT - ( 03 Mar 2017 06:30 )  PTT:40.4 sec    04 Mar 2017 06:43    137    |  102    |  14     ----------------------------<  123    3.7     |  29     |  0.92     Ca    7.9        04 Mar 2017 06:43  Phos  2.4       04 Mar 2017 06:43  Mg     2.3       04 Mar 2017 06:43      MEDICATIONS  (STANDING):  sodium chloride 0.45%. 1000milliLiter(s) IV Continuous <Continuous>  heparin  Injectable 5000Unit(s) SubCutaneous every 8 hours  atorvastatin 80milliGRAM(s) Oral at bedtime  insulin lispro (HumaLOG) corrective regimen sliding scale  SubCutaneous Before meals and at bedtime  dextrose 5%. 1000milliLiter(s) IV Continuous <Continuous>  dextrose 50% Injectable 12.5Gram(s) IV Push once  dextrose 50% Injectable 25Gram(s) IV Push once  dextrose 50% Injectable 25Gram(s) IV Push once  famotidine    Tablet 20milliGRAM(s) Oral two times a day  senna 2Tablet(s) Oral at bedtime  docusate sodium 100milliGRAM(s) Oral three times a day  sodium chloride 0.9% lock flush 3milliLiter(s) IV Push every 8 hours  aspirin enteric coated 325milliGRAM(s) Oral daily  furosemide    Tablet 20milliGRAM(s) Oral daily  metoprolol 12.5milliGRAM(s) Oral two times a day    MEDICATIONS  (PRN):  dextrose Gel 1Dose(s) Oral once PRN Blood Glucose LESS THAN 70 milliGRAM(s)/deciliter  glucagon  Injectable 1milliGRAM(s) IntraMuscular once PRN Glucose LESS THAN 70 milligrams/deciliter  acetaminophen   Tablet. 650milliGRAM(s) Oral every 6 hours PRN Mild Pain (1 - 3)  polyethylene glycol 3350 17Gram(s) Oral daily PRN Constipation  oxyCODONE  5 mG/acetaminophen 325 mG 1Tablet(s) Oral every 4 hours PRN Moderate Pain (4 - 6)  oxyCODONE  5 mG/acetaminophen 325 mG 2Tablet(s) Oral every 4 hours PRN Severe Pain (7 - 10)      RADIOLOGY & ADDITIONAL TESTS:  3/4/17 CXR: b/l pleural effusions/atelectasis      A/P: 51 year old male PMHx HTN, HLD, CAD hx NSTEMI 7/2016s/p PCI with RAMÍREZ to prox and mRCA who presented his cardiologist c/o non radiating chest pain. Patient referred for cardiac cath which showed 3VCAD. Dr. Denny was consulted and patient underwent OPCAB x 3 on 3/1/17. Procedure was uncomplicated. Pt with intermittent fever likely from atelectasis. Continue to monitor, possible D/C home tomorrow or monday.    Neurovascular: No delirium. Pain well controlled with current regimen.  -continue current pain regimen     Cardiovascular: Hemodynamically stable. HR controlled.  -continue to monitor BP/HR/Tele   -continue lasix and lopressor 12.5mg PO QD and lipitor and ASA 325mg PO QD    Respiratory: Sat 97% on RA   -Encourage C+DB and Use of IS 10x / hr while awake.  -f/u AM PA/LAT CXR     GI: Stable.  -PPX with pepcid   -PO Diet.    Renal / : continue to monitor daily labs   -repleted potassium   -Monitor renal function.  -pt w/ h/o ED will need to follow-up with outpatient PCP or urologist   -Monitor I/O's.    Endocrine:    -A1c- 6.2  -d/c'ed RISS, non-diabetic a1c is <6.5. Pt encouraged to eat low sugar diet and follow-up with primary care provider for pre-diabetes   -TSH- 0.78    Hematologic: H/H at 8.3/24.6, continue to trend daily labs   -plt improved to 118 from 90  -CBC  -Coagulation Panel.    ID: continue to monitor Temp, fever this .5. Atelectasis on CXR encouraging ambulation and IS   -WBC downtrending to 9.6 from 10.8  -Observe for SIRS/Sepsis Syndrome.    Prophylaxis:  -DVT prophylaxis with 5000 SubQ Heparin q8h.  -SCD's    Disposition:  -D/C home Sunday/Monday

## 2017-03-05 VITALS — TEMPERATURE: 99 F

## 2017-03-05 LAB
ANION GAP SERPL CALC-SCNC: 8 MMOL/L — LOW (ref 9–16)
BUN SERPL-MCNC: 16 MG/DL — SIGNIFICANT CHANGE UP (ref 7–23)
CALCIUM SERPL-MCNC: 7.8 MG/DL — LOW (ref 8.5–10.5)
CHLORIDE SERPL-SCNC: 104 MMOL/L — SIGNIFICANT CHANGE UP (ref 96–108)
CO2 SERPL-SCNC: 27 MMOL/L — SIGNIFICANT CHANGE UP (ref 22–31)
CREAT SERPL-MCNC: 0.77 MG/DL — SIGNIFICANT CHANGE UP (ref 0.5–1.3)
GLUCOSE SERPL-MCNC: 101 MG/DL — HIGH (ref 70–99)
HCT VFR BLD CALC: 23.4 % — LOW (ref 39–50)
HGB BLD-MCNC: 7.7 G/DL — LOW (ref 13–17)
MAGNESIUM SERPL-MCNC: 2.4 MG/DL — SIGNIFICANT CHANGE UP (ref 1.6–2.4)
MCHC RBC-ENTMCNC: 29.2 PG — SIGNIFICANT CHANGE UP (ref 27–34)
MCHC RBC-ENTMCNC: 32.9 G/DL — SIGNIFICANT CHANGE UP (ref 32–36)
MCV RBC AUTO: 88.6 FL — SIGNIFICANT CHANGE UP (ref 80–100)
PLATELET # BLD AUTO: 148 K/UL — LOW (ref 150–400)
POTASSIUM SERPL-MCNC: 3.8 MMOL/L — SIGNIFICANT CHANGE UP (ref 3.5–5.3)
POTASSIUM SERPL-SCNC: 3.8 MMOL/L — SIGNIFICANT CHANGE UP (ref 3.5–5.3)
RBC # BLD: 2.64 M/UL — LOW (ref 4.2–5.8)
RBC # FLD: 12.9 % — SIGNIFICANT CHANGE UP (ref 10.3–16.9)
SODIUM SERPL-SCNC: 139 MMOL/L — SIGNIFICANT CHANGE UP (ref 135–145)
WBC # BLD: 7.2 K/UL — SIGNIFICANT CHANGE UP (ref 3.8–10.5)
WBC # FLD AUTO: 7.2 K/UL — SIGNIFICANT CHANGE UP (ref 3.8–10.5)

## 2017-03-05 PROCEDURE — 85018 HEMOGLOBIN: CPT

## 2017-03-05 PROCEDURE — 82803 BLOOD GASES ANY COMBINATION: CPT

## 2017-03-05 PROCEDURE — P9016: CPT

## 2017-03-05 PROCEDURE — 94002 VENT MGMT INPAT INIT DAY: CPT

## 2017-03-05 PROCEDURE — 71046 X-RAY EXAM CHEST 2 VIEWS: CPT

## 2017-03-05 PROCEDURE — 84295 ASSAY OF SERUM SODIUM: CPT

## 2017-03-05 PROCEDURE — 85025 COMPLETE CBC W/AUTO DIFF WBC: CPT

## 2017-03-05 PROCEDURE — P9045: CPT

## 2017-03-05 PROCEDURE — 71045 X-RAY EXAM CHEST 1 VIEW: CPT

## 2017-03-05 PROCEDURE — 85027 COMPLETE CBC AUTOMATED: CPT

## 2017-03-05 PROCEDURE — 36430 TRANSFUSION BLD/BLD COMPNT: CPT

## 2017-03-05 PROCEDURE — 83735 ASSAY OF MAGNESIUM: CPT

## 2017-03-05 PROCEDURE — 86923 COMPATIBILITY TEST ELECTRIC: CPT

## 2017-03-05 PROCEDURE — 85730 THROMBOPLASTIN TIME PARTIAL: CPT

## 2017-03-05 PROCEDURE — 36415 COLL VENOUS BLD VENIPUNCTURE: CPT

## 2017-03-05 PROCEDURE — 80053 COMPREHEN METABOLIC PANEL: CPT

## 2017-03-05 PROCEDURE — 86900 BLOOD TYPING SEROLOGIC ABO: CPT

## 2017-03-05 PROCEDURE — C1889: CPT

## 2017-03-05 PROCEDURE — 97162 PT EVAL MOD COMPLEX 30 MIN: CPT

## 2017-03-05 PROCEDURE — 84132 ASSAY OF SERUM POTASSIUM: CPT

## 2017-03-05 PROCEDURE — 93005 ELECTROCARDIOGRAM TRACING: CPT

## 2017-03-05 PROCEDURE — 80048 BASIC METABOLIC PNL TOTAL CA: CPT

## 2017-03-05 PROCEDURE — 85610 PROTHROMBIN TIME: CPT

## 2017-03-05 PROCEDURE — 86850 RBC ANTIBODY SCREEN: CPT

## 2017-03-05 PROCEDURE — 82330 ASSAY OF CALCIUM: CPT

## 2017-03-05 PROCEDURE — 71020: CPT | Mod: 26

## 2017-03-05 PROCEDURE — 86901 BLOOD TYPING SEROLOGIC RH(D): CPT

## 2017-03-05 PROCEDURE — 84100 ASSAY OF PHOSPHORUS: CPT

## 2017-03-05 RX ORDER — ATORVASTATIN CALCIUM 80 MG/1
1 TABLET, FILM COATED ORAL
Qty: 0 | Refills: 0 | COMMUNITY

## 2017-03-05 RX ORDER — ASPIRIN/CALCIUM CARB/MAGNESIUM 324 MG
1 TABLET ORAL
Qty: 0 | Refills: 0 | COMMUNITY

## 2017-03-05 RX ORDER — FUROSEMIDE 40 MG
1 TABLET ORAL
Qty: 3 | Refills: 0 | OUTPATIENT
Start: 2017-03-05 | End: 2017-03-07

## 2017-03-05 RX ORDER — POTASSIUM CHLORIDE 20 MEQ
20 PACKET (EA) ORAL ONCE
Qty: 0 | Refills: 0 | Status: COMPLETED | OUTPATIENT
Start: 2017-03-05 | End: 2017-03-05

## 2017-03-05 RX ORDER — METOPROLOL TARTRATE 50 MG
1 TABLET ORAL
Qty: 0 | Refills: 0 | COMMUNITY

## 2017-03-05 RX ORDER — ASPIRIN/CALCIUM CARB/MAGNESIUM 324 MG
1 TABLET ORAL
Qty: 30 | Refills: 0 | OUTPATIENT
Start: 2017-03-05 | End: 2017-04-04

## 2017-03-05 RX ORDER — ATORVASTATIN CALCIUM 80 MG/1
1 TABLET, FILM COATED ORAL
Qty: 30 | Refills: 0 | OUTPATIENT
Start: 2017-03-05 | End: 2017-04-04

## 2017-03-05 RX ORDER — METOPROLOL TARTRATE 50 MG
0.5 TABLET ORAL
Qty: 30 | Refills: 0 | OUTPATIENT
Start: 2017-03-05 | End: 2017-04-04

## 2017-03-05 RX ORDER — INFLUENZA VIRUS VACCINE 15; 15; 15; 15 UG/.5ML; UG/.5ML; UG/.5ML; UG/.5ML
0.5 SUSPENSION INTRAMUSCULAR ONCE
Qty: 0 | Refills: 0 | Status: DISCONTINUED | OUTPATIENT
Start: 2017-03-05 | End: 2017-03-05

## 2017-03-05 RX ORDER — ACETAMINOPHEN 500 MG
2 TABLET ORAL
Qty: 100 | Refills: 0 | OUTPATIENT
Start: 2017-03-05

## 2017-03-05 RX ORDER — LISINOPRIL 2.5 MG/1
1 TABLET ORAL
Qty: 0 | Refills: 0 | COMMUNITY

## 2017-03-05 RX ORDER — CLOPIDOGREL BISULFATE 75 MG/1
1 TABLET, FILM COATED ORAL
Qty: 0 | Refills: 0 | COMMUNITY

## 2017-03-05 RX ORDER — OXYCODONE HYDROCHLORIDE 5 MG/1
1 TABLET ORAL
Qty: 65 | Refills: 0 | OUTPATIENT
Start: 2017-03-05

## 2017-03-05 RX ORDER — IBUPROFEN 200 MG
1 TABLET ORAL
Qty: 100 | Refills: 0 | OUTPATIENT
Start: 2017-03-05

## 2017-03-05 RX ORDER — POLYETHYLENE GLYCOL 3350 17 G/17G
17 POWDER, FOR SOLUTION ORAL
Qty: 1 | Refills: 0 | OUTPATIENT
Start: 2017-03-05

## 2017-03-05 RX ADMIN — Medication 12.5 MILLIGRAM(S): at 06:42

## 2017-03-05 RX ADMIN — HEPARIN SODIUM 5000 UNIT(S): 5000 INJECTION INTRAVENOUS; SUBCUTANEOUS at 06:43

## 2017-03-05 RX ADMIN — Medication 100 MILLIGRAM(S): at 06:43

## 2017-03-05 RX ADMIN — SODIUM CHLORIDE 3 MILLILITER(S): 9 INJECTION INTRAMUSCULAR; INTRAVENOUS; SUBCUTANEOUS at 06:43

## 2017-03-05 RX ADMIN — Medication 20 MILLIEQUIVALENT(S): at 08:57

## 2017-03-05 RX ADMIN — FAMOTIDINE 20 MILLIGRAM(S): 10 INJECTION INTRAVENOUS at 06:42

## 2017-03-05 RX ADMIN — Medication 20 MILLIGRAM(S): at 06:49

## 2017-03-05 RX ADMIN — Medication 325 MILLIGRAM(S): at 11:16

## 2017-03-05 NOTE — PROGRESS NOTE ADULT - SUBJECTIVE AND OBJECTIVE BOX
Patient discussed on morning rounds with Dr. Denny/Donny     Operation / Date: CABGx3 3/1/17    Surgeon: Dr. Denny    Referring Physician:    SUBJECTIVE ASSESSMENT:  51y Male POD#4 from CABGx4. Pt feeling well this AM. Denies SOB or CP.    Hospital Course:  52 y/o M with PMHx HTN, HLD, NSTEMI s/p (RAMÍREZ prox and mRCA with known residual LAD disease, non-compliant on medications due to insurance issues) 7/2016  at OSH, who presented for a f/u visit in Dr. Ordaz's office with c/o CP. Cardiac catheterization revealed 3 vessel CAD and pt was referred to Dr. Denny for surgical evaluation. Pt was deemed a surgical candidate and underwent elective CABGx3 (LIMA - LAD, SVG - RCA, SVG - Ramus, EF 60%) on 3/1/17 with Dr. Denny. Intra-op pt was given 1UPRBC and post-op was transferred to CTICU intubated. POD#0 pt was extubated. POD#1, pt was transferred to  and was given 1UPRBC with appropriate response. POD#2, pt had asymptomatic fever, CXR revealed pleural effusions VS atelectasis. pt encouraged to ambulated and increase use of incentive spirometer and fever resolved. POD#4, as per  pt stable and ready for discharge home.     Vital Signs Last 24 Hrs  T(C): 36.9, Max: 37.6 (03-04 @ 17:00)  T(F): 98.5, Max: 99.7 (03-04 @ 22:00)  HR: 86 (78 - 88)  BP: 114/63 (107/68 - 127/59)  BP(mean): 81 (80 - 85)  RR: 16 (16 - 19)  SpO2: 100% (91% - 100%)  I&O's Detail  I & Os for 24h ending 05 Mar 2017 07:00  =============================================  IN:    Oral Fluid: 850 ml    Total IN: 850 ml  ---------------------------------------------  OUT:    Total OUT: 0 ml  ---------------------------------------------  Total NET: 850 ml    I & Os for current day (as of 05 Mar 2017 09:10)  =============================================  IN:    Total IN: 0 ml  ---------------------------------------------  OUT:    Total OUT: 0 ml  ---------------------------------------------  Total NET: 0 ml      EPICARDIAL WIRES REMOVED: Yes  TIE DOWNS REMOVED: Yes    PHYSICAL EXAM:    General:   Neurological:  Cardiovascular:  Respiratory:  Gastrointestinal:  Extremities:  Vascular:  Incision Sites:    LABS:                        7.7    7.2   )-----------( 148      ( 05 Mar 2017 06:55 )             23.4       COUMADIN:No     05 Mar 2017 06:55    139    |  104    |  16     ----------------------------<  101    3.8     |  27     |  0.77     Ca    7.8        05 Mar 2017 06:55  Phos  2.4       04 Mar 2017 06:43  Mg     2.4       05 Mar 2017 06:55      MEDICATIONS  (STANDING):  sodium chloride 0.45%. 1000milliLiter(s) IV Continuous <Continuous>  heparin  Injectable 5000Unit(s) SubCutaneous every 8 hours  atorvastatin 80milliGRAM(s) Oral at bedtime  famotidine    Tablet 20milliGRAM(s) Oral two times a day  senna 2Tablet(s) Oral at bedtime  docusate sodium 100milliGRAM(s) Oral three times a day  sodium chloride 0.9% lock flush 3milliLiter(s) IV Push every 8 hours  aspirin enteric coated 325milliGRAM(s) Oral daily  furosemide    Tablet 20milliGRAM(s) Oral daily  metoprolol 12.5milliGRAM(s) Oral two times a day      Discharge CXR:    D/C Instructions:  -Please call the office on Monday 3/6/17 to schedule a follow-up appointment with Dr. Denny within 1 week of discharge.  The office is located at [facility], [facility], 4th floor. Call us with any questions #[phone]. Prompt #2   -Your Hemaglobin A1c was 6.1 on admission which is a marker for pre-diabetes. Please maintain a low sugar diet at home and make an appointment with your primary care provider within 1-2 weeks for discussion/educate about diabetes and possible management.  -Walk daily as tolerated and use your incentive spirometer ten times every hour.  -No driving or strenuous activity/exercise for 6 weeks, or until cleared by your surgeon. Please do not lift anything greater than ten pounds.   -Gently clean your incisions with anti-bacterial soap and water, pat dry.  You may leave them open to air.  -Call your doctor if you have shortness of breath, chest pain not relieved by pain medication, dizziness, fever >101.5, or increased redness or drainage from incisions.. Patient discussed on morning rounds with Dr. Denny/Donny     Operation / Date: CABGx3 3/1/17    Surgeon: Dr. Denny    Referring Physician:    SUBJECTIVE ASSESSMENT:  51y Male POD#4 from CABGx4. Pt feeling well this AM. Denies SOB or CP.    Hospital Course:  50 y/o M with PMHx HTN, HLD, NSTEMI s/p (RAMÍREZ prox and mRCA with known residual LAD disease, non-compliant on medications due to insurance issues) 7/2016  at OSH, who presented for a f/u visit in Dr. Ordaz's office with c/o CP. Cardiac catheterization revealed 3 vessel CAD and pt was referred to Dr. Denny for surgical evaluation. Pt was deemed a surgical candidate and underwent elective CABGx3 (LIMA - LAD, SVG - RCA, SVG - Ramus, EF 60%) on 3/1/17 with Dr. Denny. Intra-op pt was given 1UPRBC and post-op was transferred to CTICU intubated. POD#0 pt was extubated. POD#1, pt was transferred to  and was given 1UPRBC with appropriate response. POD#2, pt had asymptomatic fever, CXR revealed pleural effusions VS atelectasis. pt encouraged to ambulated and increase use of incentive spirometer and fever resolved. POD#4, as per  pt stable and ready for discharge home.     Vital Signs Last 24 Hrs  T(C): 36.9, Max: 37.6 (03-04 @ 17:00)  T(F): 98.5, Max: 99.7 (03-04 @ 22:00)  HR: 86 (78 - 88)  BP: 114/63 (107/68 - 127/59)  BP(mean): 81 (80 - 85)  RR: 16 (16 - 19)  SpO2: 100% (91% - 100%)  I&O's Detail  I & Os for 24h ending 05 Mar 2017 07:00  =============================================  IN:    Oral Fluid: 850 ml    Total IN: 850 ml  ---------------------------------------------  OUT:    Total OUT: 0 ml  ---------------------------------------------  Total NET: 850 ml    I & Os for current day (as of 05 Mar 2017 09:10)  =============================================  IN:    Total IN: 0 ml  ---------------------------------------------  OUT:    Total OUT: 0 ml  ---------------------------------------------  Total NET: 0 ml      EPICARDIAL WIRES REMOVED: Yes  TIE DOWNS REMOVED: Yes    PHYSICAL EXAM:    General: NAD  Neurological: Moving all extremities   Cardiovascular: RRR no m/r/g  Respiratory: CTA b/l   Gastrointestinal: NT-ND, soft   Extremities: WWP +1-2pitting edema to left lower extremity localized to EVH site. RLE no edema. no calf tenderness b/l   Incision Sites: Sternotomy and Left EVH site CDI, no erythema, drainage or ecchymosis.    LABS:                        7.7    7.2   )-----------( 148      ( 05 Mar 2017 06:55 )             23.4       COUMADIN:No     05 Mar 2017 06:55    139    |  104    |  16     ----------------------------<  101    3.8     |  27     |  0.77     Ca    7.8        05 Mar 2017 06:55  Phos  2.4       04 Mar 2017 06:43  Mg     2.4       05 Mar 2017 06:55      MEDICATIONS  (STANDING):  sodium chloride 0.45%. 1000milliLiter(s) IV Continuous <Continuous>  heparin  Injectable 5000Unit(s) SubCutaneous every 8 hours  atorvastatin 80milliGRAM(s) Oral at bedtime  famotidine    Tablet 20milliGRAM(s) Oral two times a day  senna 2Tablet(s) Oral at bedtime  docusate sodium 100milliGRAM(s) Oral three times a day  sodium chloride 0.9% lock flush 3milliLiter(s) IV Push every 8 hours  aspirin enteric coated 325milliGRAM(s) Oral daily  furosemide    Tablet 20milliGRAM(s) Oral daily  metoprolol 12.5milliGRAM(s) Oral two times a day      Discharge CXR:    D/C Instructions:  -Please call the office on Monday 3/6/17 to schedule a follow-up appointment with Dr. Denny within 1 week of discharge.  The office is located at [facility], [facility], 4th floor. Call us with any questions #[phone]. Prompt #2   -Your Hemaglobin A1c was 6.1 on admission which is a marker for pre-diabetes. Please maintain a low sugar diet at home and make an appointment with your primary care provider within 1-2 weeks for discussion/educate about diabetes and possible management.  -Walk daily as tolerated and use your incentive spirometer ten times every hour.  -No driving or strenuous activity/exercise for 6 weeks, or until cleared by your surgeon. Please do not lift anything greater than ten pounds.   -Gently clean your incisions with anti-bacterial soap and water, pat dry.  You may leave them open to air.  -Call your doctor if you have shortness of breath, chest pain not relieved by pain medication, dizziness, fever >101.5, or increased redness or drainage from incisions.. Patient discussed on morning rounds with Dr. Denny/Donny     Operation / Date: CABGx3 3/1/17    Surgeon: Dr. Denny    Referring Physician: Dr. Ordaz     SUBJECTIVE ASSESSMENT:  51y Male POD#4 from CABGx4. Pt feeling well this AM. Denies SOB or CP.    Hospital Course:  52 y/o M with PMHx HTN, HLD, NSTEMI s/p (RAMÍREZ prox and mRCA with known residual LAD disease, non-compliant on medications due to insurance issues) 7/2016  at OSH, who presented for a f/u visit in Dr. Ordaz's office with c/o CP. Cardiac catheterization revealed 3 vessel CAD and pt was referred to Dr. Denny for surgical evaluation. Pt was deemed a surgical candidate and underwent elective CABGx3 (LIMA - LAD, SVG - RCA, SVG - Ramus, EF 60%) on 3/1/17 with Dr. Denny. Intra-op pt was given 1UPRBC and post-op was transferred to CTICU intubated. POD#0 pt was extubated. POD#1, pt was transferred to  and was given 1UPRBC with appropriate response. POD#2, pt had asymptomatic fever, CXR revealed pleural effusions VS atelectasis. pt encouraged to ambulated and increase use of incentive spirometer and fever resolved. POD#4, as per  pt stable and ready for discharge home.     Vital Signs Last 24 Hrs  T(C): 36.9, Max: 37.6 (03-04 @ 17:00)  T(F): 98.5, Max: 99.7 (03-04 @ 22:00)  HR: 86 (78 - 88)  BP: 114/63 (107/68 - 127/59)  BP(mean): 81 (80 - 85)  RR: 16 (16 - 19)  SpO2: 100% (91% - 100%)  I&O's Detail  I & Os for 24h ending 05 Mar 2017 07:00  =============================================  IN:    Oral Fluid: 850 ml    Total IN: 850 ml  ---------------------------------------------  OUT:    Total OUT: 0 ml  ---------------------------------------------  Total NET: 850 ml    I & Os for current day (as of 05 Mar 2017 09:10)  =============================================  IN:    Total IN: 0 ml  ---------------------------------------------  OUT:    Total OUT: 0 ml  ---------------------------------------------  Total NET: 0 ml      EPICARDIAL WIRES REMOVED: Yes  TIE DOWNS REMOVED: Yes    PHYSICAL EXAM:    General: NAD  Neurological: Moving all extremities   Cardiovascular: RRR no m/r/g  Respiratory: CTA b/l   Gastrointestinal: NT-ND, soft   Extremities: WWP +1-2pitting edema to left lower extremity localized to EVH site. RLE no edema. no calf tenderness b/l   Incision Sites: Sternotomy and Left EVH site CDI, no erythema, drainage or ecchymosis.    LABS:                        7.7    7.2   )-----------( 148      ( 05 Mar 2017 06:55 )             23.4       COUMADIN:No     05 Mar 2017 06:55    139    |  104    |  16     ----------------------------<  101    3.8     |  27     |  0.77     Ca    7.8        05 Mar 2017 06:55  Phos  2.4       04 Mar 2017 06:43  Mg     2.4       05 Mar 2017 06:55      MEDICATIONS  (STANDING):  sodium chloride 0.45%. 1000milliLiter(s) IV Continuous <Continuous>  heparin  Injectable 5000Unit(s) SubCutaneous every 8 hours  atorvastatin 80milliGRAM(s) Oral at bedtime  famotidine    Tablet 20milliGRAM(s) Oral two times a day  senna 2Tablet(s) Oral at bedtime  docusate sodium 100milliGRAM(s) Oral three times a day  sodium chloride 0.9% lock flush 3milliLiter(s) IV Push every 8 hours  aspirin enteric coated 325milliGRAM(s) Oral daily  furosemide    Tablet 20milliGRAM(s) Oral daily  metoprolol 12.5milliGRAM(s) Oral two times a day      Discharge CXR:    D/C Instructions:  -Please call the office on Monday 3/6/17 to schedule a follow-up appointment with Dr. Denny within 1 week of discharge.  The office is located at [facility], [facility], 4th floor. Call us with any questions #[phone]. Prompt #2   -Your Hemaglobin A1c was 6.1 on admission which is a marker for pre-diabetes. Please maintain a low sugar diet at home and make an appointment with your primary care provider within 1-2 weeks for discussion/educate about diabetes and possible management.  -Walk daily as tolerated and use your incentive spirometer ten times every hour.  -No driving or strenuous activity/exercise for 6 weeks, or until cleared by your surgeon. Please do not lift anything greater than ten pounds.   -Gently clean your incisions with anti-bacterial soap and water, pat dry.  You may leave them open to air.  -Call your doctor if you have shortness of breath, chest pain not relieved by pain medication, dizziness, fever >101.5, or increased redness or drainage from incisions..

## 2017-03-05 NOTE — CHART NOTE - NSCHARTNOTEFT_GEN_A_CORE
CT SURGERY NOTE:    As per Dr. Denny, pacing wires cut at the bedside without incident. Pt tolerated procedure well. Nursing notified.

## 2017-03-06 PROBLEM — I25.10 ATHEROSCLEROTIC HEART DISEASE OF NATIVE CORONARY ARTERY WITHOUT ANGINA PECTORIS: Chronic | Status: ACTIVE | Noted: 2017-02-23

## 2017-03-07 DIAGNOSIS — I50.32 CHRONIC DIASTOLIC (CONGESTIVE) HEART FAILURE: ICD-10-CM

## 2017-03-07 DIAGNOSIS — Z91.14 PATIENT'S OTHER NONCOMPLIANCE WITH MEDICATION REGIMEN: ICD-10-CM

## 2017-03-07 DIAGNOSIS — Z79.02 LONG TERM (CURRENT) USE OF ANTITHROMBOTICS/ANTIPLATELETS: ICD-10-CM

## 2017-03-07 DIAGNOSIS — I25.10 ATHEROSCLEROTIC HEART DISEASE OF NATIVE CORONARY ARTERY WITHOUT ANGINA PECTORIS: ICD-10-CM

## 2017-03-07 DIAGNOSIS — J98.11 ATELECTASIS: ICD-10-CM

## 2017-03-07 DIAGNOSIS — I11.0 HYPERTENSIVE HEART DISEASE WITH HEART FAILURE: ICD-10-CM

## 2017-03-07 DIAGNOSIS — Z87.891 PERSONAL HISTORY OF NICOTINE DEPENDENCE: ICD-10-CM

## 2017-03-07 DIAGNOSIS — Z79.82 LONG TERM (CURRENT) USE OF ASPIRIN: ICD-10-CM

## 2017-03-07 DIAGNOSIS — Z95.5 PRESENCE OF CORONARY ANGIOPLASTY IMPLANT AND GRAFT: ICD-10-CM

## 2017-03-15 VITALS — HEIGHT: 64.96 IN | BODY MASS INDEX: 25.71 KG/M2 | WEIGHT: 154.32 LBS

## 2017-03-15 DIAGNOSIS — Z87.898 PERSONAL HISTORY OF OTHER SPECIFIED CONDITIONS: ICD-10-CM

## 2017-03-15 DIAGNOSIS — Z95.5 PRESENCE OF CORONARY ANGIOPLASTY IMPLANT AND GRAFT: ICD-10-CM

## 2017-03-15 DIAGNOSIS — I25.2 OLD MYOCARDIAL INFARCTION: ICD-10-CM

## 2017-03-15 DIAGNOSIS — Z87.891 PERSONAL HISTORY OF NICOTINE DEPENDENCE: ICD-10-CM

## 2017-03-15 DIAGNOSIS — I25.10 ATHEROSCLEROTIC HEART DISEASE OF NATIVE CORONARY ARTERY W/OUT ANGINA PECTORIS: ICD-10-CM

## 2017-03-15 DIAGNOSIS — Z95.1 PRESENCE OF AORTOCORONARY BYPASS GRAFT: ICD-10-CM

## 2017-03-15 DIAGNOSIS — Z86.39 PERSONAL HISTORY OF OTHER ENDOCRINE, NUTRITIONAL AND METABOLIC DISEASE: ICD-10-CM

## 2017-03-15 DIAGNOSIS — Z09 ENCOUNTER FOR FOLLOW-UP EXAMINATION AFTER COMPLETED TREATMENT FOR CONDITIONS OTHER THAN MALIGNANT NEOPLASM: ICD-10-CM

## 2017-03-15 DIAGNOSIS — Z86.79 PERSONAL HISTORY OF OTHER DISEASES OF THE CIRCULATORY SYSTEM: ICD-10-CM

## 2017-03-15 RX ORDER — IBUPROFEN 400 MG/1
400 TABLET, FILM COATED ORAL EVERY 6 HOURS
Refills: 0 | Status: ACTIVE | COMMUNITY

## 2017-03-15 RX ORDER — LORATADINE 5 MG
17 TABLET,CHEWABLE ORAL
Refills: 0 | Status: ACTIVE | COMMUNITY

## 2017-03-15 RX ORDER — METOPROLOL TARTRATE 25 MG/1
25 TABLET, FILM COATED ORAL
Qty: 30 | Refills: 6 | Status: ACTIVE | COMMUNITY

## 2017-03-15 RX ORDER — ASPIRIN 325 MG/1
325 TABLET, FILM COATED ORAL DAILY
Qty: 30 | Refills: 5 | Status: ACTIVE | COMMUNITY

## 2017-03-15 RX ORDER — OXYCODONE HYDROCHLORIDE AND ACETAMINOPHEN 5; 325 MG/1; MG/1
5-325 TABLET ORAL
Refills: 0 | Status: ACTIVE | COMMUNITY

## 2017-03-15 RX ORDER — ACETAMINOPHEN 325 MG/1
325 TABLET ORAL
Refills: 0 | Status: ACTIVE | COMMUNITY

## 2017-03-15 RX ORDER — ATORVASTATIN CALCIUM 80 MG/1
80 TABLET, FILM COATED ORAL
Qty: 90 | Refills: 3 | Status: ACTIVE | COMMUNITY

## 2017-03-17 ENCOUNTER — APPOINTMENT (OUTPATIENT)
Dept: CARDIOTHORACIC SURGERY | Facility: CLINIC | Age: 52
End: 2017-03-17

## 2017-03-17 VITALS
HEART RATE: 76 BPM | DIASTOLIC BLOOD PRESSURE: 67 MMHG | SYSTOLIC BLOOD PRESSURE: 129 MMHG | OXYGEN SATURATION: 98 % | RESPIRATION RATE: 16 BRPM

## 2017-03-17 RX ORDER — FUROSEMIDE 20 MG/1
20 TABLET ORAL DAILY
Qty: 30 | Refills: 1 | Status: DISCONTINUED | COMMUNITY
End: 2017-03-17

## 2017-04-26 ENCOUNTER — APPOINTMENT (OUTPATIENT)
Dept: HEART AND VASCULAR | Facility: CLINIC | Age: 52
End: 2017-04-26

## 2017-05-15 ENCOUNTER — MESSAGE (OUTPATIENT)
Age: 52
End: 2017-05-15

## 2019-08-28 NOTE — H&P ADULT. - NEGATIVE CARDIOVASCULAR SYMPTOMS
- - - no paroxysmal nocturnal dyspnea/no orthopnea/no dyspnea on exertion/no peripheral edema/no palpitations/no claudication

## 2019-09-16 NOTE — H&P ADULT. - PRO ANTICIPATED DISCH DISP
H&P- Clemente Ramirez 1941, [de-identified] y o  male MRN: [id number]    Unit/Bed#: -01 Encounter: [id number]    Primary Care Provider: Ct Pratt DO   Date and time admitted to hospital: 9/15/2019  3:37 PM        * Weakness generalized  Assessment & Plan  · Unclear etiology  · Patient has history of gallstones and was on antibiotics possible source he also has chronic indwelling Haile catheter and history of ESBL  · He was started on ertapenem in the ER  · Follow-up cultures, send procalcitonin level check blood cultures  · Will hold further antibiotics - lactic acid normal    Biliary sludge  Assessment & Plan  · Patient with right upper quadrant showing sludge, HIDA scan indicative of cholecystitis  · He had percutaneous cholecystectomy on 09/06/2019 and will need a full cholecystectomy in the future  · He has completed his 5 days of p o  Levaquin  · Will consult surgery for evaluation if procedure is needed at this time as a source for his weakness and fatigue    Moderate protein-calorie malnutrition ([address] )  Assessment & Plan  Malnutrition Findings:        secondary to chronic disease    BMI Findings: Body mass index is 23 26 kg/m²     Consult dietitian    Chronic indwelling Haile catheter  Assessment & Plan  · Patient with history of ESBL and hydroureter has chronic Haile    Acute kidney injury superimposed on chronic kidney disease (HCC)  Assessment & Plan  · Chronic kidney disease stage 3  · Crepitus line is reported at 1 4-1 5  · Will consult Nephrology    Neuroendocrine tumor  Assessment & Plan  · Monthly chemo injection  · Follow up outpatient with Oncology, Dr Jaimee Nielsen deficiency anemia secondary to inadequate dietary iron intake  Assessment & Plan  · Hemoglobin is stable    Essential hypertension  Assessment & Plan  · Continue meds and monitor    Type 2 diabetes mellitus with stage 3 chronic kidney disease, with long-term current use of insulin [facility])  Assessment & Plan  Lab Results Component Value Date    HGBA1C 8 1 (H) 06/14/2019       No results for input(s): POCGLU in the last 72 hours  Blood Sugar Average: Last 72 hrs:  ·  continue insulin coverage monitoring blood sugars        VTE Prophylaxis: Heparin  Code Status: Full code  POLST: There is no POLST form on file for this patient (pre-hospital)  Discussion with patient    Anticipated Length of Stay:  Patient will be admitted on an Inpatient basis with an anticipated length of stay of  > 2 midnights  Justification for Hospital Stay: IV antibiotics, specialist input    Chief Complaint:   weakness    History of Present Illness:    Guru Magaña is a [de-identified] y o  male who presents with weakness  Patient with PMHX of neuroendocrine tremor with Mets, cholecystitis with gallbladder sludge and had a recent percutaneous cholecystectomy on 9/9/19 with IR and was recommended that he will need a cholecystectomy in the future  Also past medical history of CAD, diabetes mellitus type 2 on insulin, hypertension, chronic kidney disease with recent ATN, urinary retention with chronic Haile was recently hospitalized and discharged on the 10th of September returned secondary to complaint of generalized weakness  Patient reports that he has been having difficulty getting out of bed since he has gone home  Today he notes it was worse, he was not able to get up to go eat  He does have multiple medical problems  He has abdominal pain but no nausea or vomiting  No fevers chills  He has a chronic Haile  Review of Systems:  Review of Systems   Constitutional: Positive for fatigue  Negative for chills and fever  HENT: Negative for sore throat and trouble swallowing  Eyes: Negative for discharge and redness  Respiratory: Positive for shortness of breath  Negative for cough and wheezing  Cardiovascular: Negative for chest pain, palpitations and leg swelling  Gastrointestinal: Positive for abdominal pain   Negative for diarrhea, nausea and vomiting  Genitourinary: Negative for hematuria  Chronic garcia   Musculoskeletal: Negative for back pain and neck pain  Skin: Negative  Neurological: Positive for weakness  Negative for dizziness and headaches  Psychiatric/Behavioral: Negative for agitation and confusion  Past Medical and Surgical History:   Past Medical History:   Diagnosis Date    BPH (benign prostatic hyperplasia)     Cancer ([facility] )     liver cancer    Cardiac disease     Coronary artery disease     Diabetes mellitus ([facility] )     DJD (degenerative joint disease)     Gait disturbance     Generalized weakness     GERD (gastroesophageal reflux disease)     Gout     History of transfusion     Hyperlipidemia     Hypertension     Pressure injury of skin     Renal disorder        Past Surgical History:   Procedure Laterality Date    CORONARY ANGIOPLASTY WITH STENT PLACEMENT      IR IMAGE GUIDED BIOPSY/ASPIRATION LIVER  1/24/2019    IR TUBE PLACEMENT ROQUE  9/6/2019       Meds/Allergies:  Prior to Admission medications    Medication Sig Start Date End Date Taking?  Authorizing Provider   amLODIPine (NORVASC) 10 mg tablet Take 10 mg by mouth daily   Yes Historical Provider, MD   aspirin 81 MG tablet Take 81 mg by mouth daily   Yes Historical Provider, MD   b complex-vitamin C-folic acid (NEPHROCAPS) 1 mg capsule Take 1 capsule by mouth daily with dinner 7/7/19  Yes Adam Alvarez MD   cholecalciferol (VITAMIN D3) 1,000 units tablet Take 1,000 Units by mouth daily   Yes Historical Provider, MD   colchicine (COLCRYS) 0 6 mg tablet Take 0 6 mg by mouth daily   Yes Historical Provider, MD   insulin lispro (HumaLOG) 100 units/mL injection Inject under the skin 3 (three) times a day before meals   Yes Historical Provider, MD   levofloxacin (LEVAQUIN) 250 mg tablet Take 1 tablet (250 mg total) by mouth every 24 hours for 5 days 9/10/19 9/15/19 Yes Amaury Perez MD   methenamine hippurate (HIPREX) 1 g tablet Take 1 g by mouth 2 (two) times a day with meals   Yes Historical Provider, MD   oxyCODONE-acetaminophen (PERCOCET) 5-325 mg per tablet Take 1 tablet by mouth every 6 (six) hours as needed for moderate pain for up to 10 daysMax Daily Amount: 4 tablets 9/10/19 9/20/19 Yes Bashir Rice MD   pantoprazole (PROTONIX) 40 mg tablet Take 1 tablet (40 mg total) by mouth daily in the early morning 2/8/19  Yes Martin Miller MD   tamsulosin [facility]) 0 4 mg Take 1 capsule (0 4 mg total) by mouth daily with dinner 2/7/19  Yes Martin Miller MD   insulin detemir (LEVEMIR) 100 units/mL subcutaneous injection Inject 10 Units under the skin daily at bedtime    Historical Provider, MD     I have reviewed home medications with patient personally  Allergies: No Known Allergies    Social History:  Marital Status:    Occupation:  Retired  Patient Pre-hospital Living Situation:  Home with Joyce Nieto, significant other  Patient Pre-hospital Level of Mobility:  Walker at home  Patient Pre-hospital Diet Restrictions:  Diabetic  Substance Use History:     Social History     Substance and Sexual Activity   Alcohol Use Never    Frequency: Never     Social History     Tobacco Use   Smoking Status Never Smoker   Smokeless Tobacco Never Used     Social History     Substance and Sexual Activity   Drug Use Never       Family History:  I have reviewed the patients family history    Physical Exam:   Vitals:   Blood Pressure: 164/73 (09/15/19 1901)  Pulse: 90 (09/15/19 1901)  Temperature: 98 1 °F (36 7 °C) (09/15/19 1901)  Temp Source: Tympanic (09/15/19 1901)  Respirations: 16 (09/15/19 1901)  Height: 5' 8" (172 7 cm) (09/15/19 1541)  Weight - Scale: 69 4 kg (153 lb) (09/15/19 1541)  SpO2: 99 % (09/15/19 1901)    Physical Exam   Constitutional: He is oriented to person, place, and time  He appears well-developed and well-nourished     Frail elderly male with temporal wasting prominent clavicles prominent ribs   HENT:   Head: Normocephalic and atraumatic  Eyes: Conjunctivae and EOM are normal  Right eye exhibits no discharge  Left eye exhibits no discharge  Neck: Normal range of motion  No tracheal deviation present  Cardiovascular: Normal rate, regular rhythm and normal heart sounds  Exam reveals no gallop and no friction rub  No murmur heard  Pulmonary/Chest: Effort normal and breath sounds normal  No respiratory distress  He has no wheezes  He has no rales  Abdominal: Soft  Bowel sounds are normal  He exhibits no distension and no mass  There is no tenderness  There is no guarding  Right upper quadrant drain   Genitourinary:   Genitourinary Comments: +garcia   Musculoskeletal: Normal range of motion  He exhibits no edema, tenderness or deformity  Neurological: He is alert and oriented to person, place, and time  Skin: Skin is warm and dry  No rash noted  No erythema  There is pallor  Psychiatric: His behavior is normal  Judgment and thought content normal    Flat affect   Nursing note and vitals reviewed  Additional Data:   Lab Results: I have personally reviewed pertinent reports  Results from last 7 days   Lab Units 09/15/19  1554   WBC Thousand/uL 14 30*   HEMOGLOBIN g/dL 11 0*   HEMATOCRIT % 33 4*   PLATELETS Thousands/uL 266   NEUTROS PCT % 82*   LYMPHS PCT % 10*   MONOS PCT % 7   EOS PCT % 1     Results from last 7 days   Lab Units 09/15/19  1606   POTASSIUM mmol/L 4 3   CHLORIDE mmol/L 105   CO2 mmol/L 16*   BUN mg/dL 59*   CREATININE mg/dL 3 12*   CALCIUM mg/dL 8 8   ALK PHOS U/L 185*   ALT U/L 14   AST U/L 10*         Results from last 7 days   Lab Units 09/10/19  1101 09/10/19  0629 09/09/19  2006 09/09/19  1550 09/09/19  1059 09/09/19  0614   POC GLUCOSE mg/dl 331* 178* 271* 362* 312* 215*     Imaging: I have personally reviewed pertinent reports  CT abdomen pelvis wo contrast   Final Result by Nallely Singer MD (09/15 1656)      1    Interval percutaneous cholecystostomy with resulting decompression of the gallbladder and no evidence of an acute abnormality in the abdomen or pelvis  2   Stable partially calcified mesenteric mass and stable partially calcified metastatic mesenteric lesion in the left paracolic gutter/pelvic inlet  Stable retroperitoneal and mesenteric prominent lymph nodes  Workstation performed: [id number]             [employer] Records Reviewed: Yes     ** Please Note: This note has been constructed using a voice recognition system   ** home w/ outpatient services

## 2019-11-08 NOTE — H&P ADULT. - ASSESSMENT
Repeat testing of patient's liver function was normal. No additional testing needed. I have sent pain medication in to pharmacy. Has MRI been scheduled to evaluate his back/leg pain? CAD    Dr. Denny has reviewed all the patient's medical records and diagnostic studies during his consultation.  He has had a lengthy discussion with the patient regarding his coronary artery disease and progression.  He has recommended that the patient is a candidate for a coronary artery bypass grafting. He has reviewed all risks, benefits, and alternatives of surgery with the patient including but not limited to stoke, bleeding, infection and death. He has quoted the patient a 2% surgical risk.  The patient agrees to proceed with surgery 3/1/17. All preoperative instructions including antibacterial showers x3 reviewed with the patient.  Last dose Plavix: 2/21/17

## 2021-08-15 NOTE — H&P ADULT. - NECK DETAILS
Patient prepared for and ready to be discharged. Patient discharged at this time to home in care of self in no acute distress after verbalizing understanding of discharge instructions. Patient left after receiving After Visit Summary instructions. IV removed prior to discharge.        Makenzie Villalobos RN  08/15/21 [id number]
supple/normal/no JVD

## 2021-11-05 NOTE — BRIEF OPERATIVE NOTE - CONDITION POST OP
[de-identified] : My review and interpretation of the radiologic studies:\par Scoliosis x-rays AP and lateral were done today.   There is thoracic curvature of the spine on AP x-ray measuring 11 degrees. Moderate postural kyphosis.  The disc heights are maintained.  Sagittal alignment is maintained.  Coronal balance is maintained.  There no vertebral abnormalities that were noticed. Risser 2.\par 
Stable

## 2022-06-07 NOTE — PHYSICAL THERAPY INITIAL EVALUATION ADULT - SIT-TO-STAND BALANCE
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
